# Patient Record
Sex: MALE | Race: WHITE | NOT HISPANIC OR LATINO | Employment: FULL TIME | URBAN - METROPOLITAN AREA
[De-identification: names, ages, dates, MRNs, and addresses within clinical notes are randomized per-mention and may not be internally consistent; named-entity substitution may affect disease eponyms.]

---

## 2018-04-17 LAB
CHOLEST SERPL-MCNC: 199 MG/DL (ref 100–199)
HDLC SERPL-MCNC: 51 MG/DL
LDLC SERPL CALC-MCNC: 105 MG/DL (ref 0–99)
TRIGL SERPL-MCNC: 213 MG/DL (ref 0–149)
VLDLC SERPL CALC-MCNC: 43 MG/DL (ref 5–40)

## 2019-01-17 LAB
BASOPHILS # BLD AUTO: 0.1 X10E3/UL (ref 0–0.2)
BASOPHILS NFR BLD AUTO: 1 %
EOSINOPHIL # BLD AUTO: 0.1 X10E3/UL (ref 0–0.4)
EOSINOPHIL NFR BLD AUTO: 1 %
ERYTHROCYTE [DISTWIDTH] IN BLOOD BY AUTOMATED COUNT: 12.8 % (ref 12.3–15.4)
HCT VFR BLD AUTO: 46.4 % (ref 37.5–51)
HGB BLD-MCNC: 15.2 G/DL (ref 13–17.7)
LYMPHOCYTES # BLD AUTO: 2.6 X10E3/UL (ref 0.7–3.1)
LYMPHOCYTES NFR BLD AUTO: 32 %
MCH RBC QN AUTO: 29.8 PG (ref 26.6–33)
MCHC RBC AUTO-ENTMCNC: 32.8 G/DL (ref 31.5–35.7)
MCV RBC AUTO: 91 FL (ref 79–97)
MONOCYTES # BLD AUTO: 0.4 X10E3/UL (ref 0.1–0.9)
MONOCYTES NFR BLD AUTO: 5 %
NEUTROPHILS # BLD AUTO: 4.9 X10E3/UL (ref 1.4–7)
NEUTROPHILS NFR BLD AUTO: 61 %
PLATELET # BLD AUTO: 306 X10E3/UL (ref 150–379)
RBC # BLD AUTO: 5.1 X10(6)/MCL (ref 4.14–5.8)
WBC # SPEC AUTO: 8.2 X10E3/UL (ref 3.4–10.8)

## 2019-01-28 LAB — MAGNESIUM SERPL-MCNC: 2.1 MG/DL (ref 1.6–2.3)

## 2019-03-26 ENCOUNTER — HISTORICAL (OUTPATIENT)
Dept: ADMINISTRATIVE | Facility: HOSPITAL | Age: 59
End: 2019-03-26

## 2019-03-26 LAB
ALBUMIN SERPL-MCNC: 4.4 G/DL (ref 3.6–4.8)
ALBUMIN/GLOB SERPL: 1.6 {RATIO} (ref 1.2–2.2)
ALP SERPL-CCNC: 52 IU/L (ref 39–117)
ALT SERPL-CCNC: 21 IU/L (ref 0–44)
AST SERPL-CCNC: 23 IU/L (ref 0–40)
BILIRUB SERPL-MCNC: 0.6 MG/DL (ref 0–1.2)
BUN SERPL-MCNC: 15 MG/DL (ref 8–27)
CALCIUM SERPL-MCNC: 10.1 MG/DL (ref 8.6–10.2)
CHLORIDE SERPL-SCNC: 100 MMOL/L (ref 96–106)
CHOLEST SERPL-MCNC: 188 MG/DL (ref 100–199)
CHOLEST/HDLC SERPL: 3.8 RATIO (ref 0–5)
CO2 SERPL-SCNC: 28 MMOL/L (ref 20–29)
CREAT SERPL-MCNC: 1.08 MG/DL (ref 0.76–1.27)
CREAT/UREA NIT SERPL: 14 (ref 10–24)
GLOBULIN SER-MCNC: 2.8 G/DL (ref 1.5–4.5)
GLUCOSE SERPL-MCNC: 103 MG/DL (ref 65–99)
HBA1C MFR BLD: 6.4 % (ref 4.8–5.6)
HDLC SERPL-MCNC: 49 MG/DL
LDLC SERPL CALC-MCNC: 118 MG/DL (ref 0–99)
POTASSIUM SERPL-SCNC: 5 MMOL/L (ref 3.5–5.2)
PROT SERPL-MCNC: 7.2 G/DL (ref 6–8.5)
SODIUM SERPL-SCNC: 141 MMOL/L (ref 134–144)
TRIGL SERPL-MCNC: 106 MG/DL (ref 0–149)
VLDLC SERPL CALC-MCNC: 21 MG/DL (ref 5–40)

## 2019-10-24 ENCOUNTER — HISTORICAL (OUTPATIENT)
Dept: ADMINISTRATIVE | Facility: HOSPITAL | Age: 59
End: 2019-10-24

## 2019-10-24 LAB
ALBUMIN SERPL-MCNC: 4.8 G/DL (ref 3.6–4.8)
ALBUMIN/GLOB SERPL: 2 {RATIO} (ref 1.2–2.2)
ALP SERPL-CCNC: 38 IU/L (ref 39–117)
ALT SERPL-CCNC: 22 IU/L (ref 0–44)
AST SERPL-CCNC: 19 IU/L (ref 0–40)
BASOPHILS # BLD AUTO: 0 X10E3/UL (ref 0–0.2)
BASOPHILS NFR BLD AUTO: 1 %
BILIRUB SERPL-MCNC: 0.8 MG/DL (ref 0–1.2)
BUN SERPL-MCNC: 18 MG/DL (ref 8–27)
CALCIUM SERPL-MCNC: 9.7 MG/DL (ref 8.6–10.2)
CHLORIDE SERPL-SCNC: 101 MMOL/L (ref 96–106)
CHOLEST SERPL-MCNC: 208 MG/DL (ref 100–199)
CHOLEST/HDLC SERPL: 4 RATIO (ref 0–5)
CO2 SERPL-SCNC: 25 MMOL/L (ref 20–29)
CREAT SERPL-MCNC: 1.21 MG/DL (ref 0.76–1.27)
CREAT/UREA NIT SERPL: 15 (ref 10–24)
EOSINOPHIL # BLD AUTO: 0 X10E3/UL (ref 0–0.4)
EOSINOPHIL NFR BLD AUTO: 1 %
ERYTHROCYTE [DISTWIDTH] IN BLOOD BY AUTOMATED COUNT: 12.9 % (ref 12.3–15.4)
GLOBULIN SER-MCNC: 2.4 G/DL (ref 1.5–4.5)
GLUCOSE SERPL-MCNC: 121 MG/DL (ref 65–99)
HBA1C MFR BLD: 6.3 % (ref 4.8–5.6)
HCT VFR BLD AUTO: 53.3 % (ref 37.5–51)
HDLC SERPL-MCNC: 52 MG/DL
HGB BLD-MCNC: 17.2 G/DL (ref 13–17.7)
LDLC SERPL CALC-MCNC: 136 MG/DL (ref 0–99)
LYMPHOCYTES # BLD AUTO: 2.6 X10E3/UL (ref 0.7–3.1)
LYMPHOCYTES NFR BLD AUTO: 37 %
MCH RBC QN AUTO: 29.9 PG (ref 26.6–33)
MCHC RBC AUTO-ENTMCNC: 32.3 G/DL (ref 31.5–35.7)
MCV RBC AUTO: 93 FL (ref 79–97)
MONOCYTES # BLD AUTO: 0.6 X10E3/UL (ref 0.1–0.9)
MONOCYTES NFR BLD AUTO: 8 %
NEUTROPHILS # BLD AUTO: 3.8 X10E3/UL (ref 1.4–7)
NEUTROPHILS NFR BLD AUTO: 53 %
PLATELET # BLD AUTO: 282 X10E3/UL (ref 150–450)
POTASSIUM SERPL-SCNC: 4.9 MMOL/L (ref 3.5–5.2)
PROT SERPL-MCNC: 7.2 G/DL (ref 6–8.5)
RBC # BLD AUTO: 5.75 X10(6)/MCL (ref 4.14–5.8)
SODIUM SERPL-SCNC: 143 MMOL/L (ref 134–144)
TRIGL SERPL-MCNC: 101 MG/DL (ref 0–149)
VLDLC SERPL CALC-MCNC: 20 MG/DL (ref 5–40)
WBC # SPEC AUTO: 7 X10E3/UL (ref 3.4–10.8)

## 2020-02-03 ENCOUNTER — HISTORICAL (OUTPATIENT)
Dept: ADMINISTRATIVE | Facility: HOSPITAL | Age: 60
End: 2020-02-03

## 2020-02-03 LAB
BASOPHILS # BLD AUTO: 0.1 X10E3/UL (ref 0–0.2)
BASOPHILS NFR BLD AUTO: 1 %
EOSINOPHIL # BLD AUTO: 0.1 X10E3/UL (ref 0–0.4)
EOSINOPHIL NFR BLD AUTO: 2 %
ERYTHROCYTE [DISTWIDTH] IN BLOOD BY AUTOMATED COUNT: 13 % (ref 11.6–15.4)
HCT VFR BLD AUTO: 48.3 % (ref 37.5–51)
HGB BLD-MCNC: 15.8 G/DL (ref 13–17.7)
LYMPHOCYTES # BLD AUTO: 2.7 X10E3/UL (ref 0.7–3.1)
LYMPHOCYTES NFR BLD AUTO: 33 %
MCH RBC QN AUTO: 29.5 PG (ref 26.6–33)
MCHC RBC AUTO-ENTMCNC: 32.7 G/DL (ref 31.5–35.7)
MCV RBC AUTO: 90 FL (ref 79–97)
MONOCYTES # BLD AUTO: 0.6 X10E3/UL (ref 0.1–0.9)
MONOCYTES NFR BLD AUTO: 7 %
NEUTROPHILS # BLD AUTO: 4.5 X10E3/UL (ref 1.4–7)
NEUTROPHILS NFR BLD AUTO: 57 %
PLATELET # BLD AUTO: 313 X10E3/UL (ref 150–450)
RBC # BLD AUTO: 5.35 X10(6)/MCL (ref 4.14–5.8)
WBC # SPEC AUTO: 8 X10E3/UL (ref 3.4–10.8)

## 2020-08-11 ENCOUNTER — HISTORICAL (OUTPATIENT)
Dept: ADMINISTRATIVE | Facility: HOSPITAL | Age: 60
End: 2020-08-11

## 2020-08-11 LAB
ALBUMIN SERPL-MCNC: 4.5 G/DL (ref 3.8–4.8)
ALBUMIN/GLOB SERPL: 1.7 {RATIO} (ref 1.2–2.2)
ALP SERPL-CCNC: 39 IU/L (ref 39–117)
ALT SERPL-CCNC: 27 IU/L (ref 0–44)
AST SERPL-CCNC: 22 IU/L (ref 0–40)
BASOPHILS # BLD AUTO: 0.1 X10E3/UL (ref 0–0.2)
BASOPHILS NFR BLD AUTO: 1 %
BILIRUB SERPL-MCNC: 0.7 MG/DL (ref 0–1.2)
BUN SERPL-MCNC: 17 MG/DL (ref 8–27)
CALCIUM SERPL-MCNC: 9.7 MG/DL (ref 8.6–10.2)
CHLORIDE SERPL-SCNC: 101 MMOL/L (ref 96–106)
CHOLEST SERPL-MCNC: 175 MG/DL (ref 100–199)
CHOLEST/HDLC SERPL: 3.4 RATIO (ref 0–5)
CO2 SERPL-SCNC: 28 MMOL/L (ref 20–29)
CREAT SERPL-MCNC: 1.17 MG/DL (ref 0.76–1.27)
CREAT/UREA NIT SERPL: 15 (ref 10–24)
EOSINOPHIL # BLD AUTO: 0.1 X10E3/UL (ref 0–0.4)
EOSINOPHIL NFR BLD AUTO: 1 %
ERYTHROCYTE [DISTWIDTH] IN BLOOD BY AUTOMATED COUNT: 12.9 % (ref 11.6–15.4)
GLOBULIN SER-MCNC: 2.6 G/DL (ref 1.5–4.5)
GLUCOSE SERPL-MCNC: 129 MG/DL (ref 65–99)
HBA1C MFR BLD: 6.6 % (ref 4.8–5.6)
HCT VFR BLD AUTO: 49.5 % (ref 37.5–51)
HDLC SERPL-MCNC: 51 MG/DL
HGB BLD-MCNC: 16 G/DL (ref 13–17.7)
LDLC SERPL CALC-MCNC: 104 MG/DL (ref 0–99)
LYMPHOCYTES # BLD AUTO: 2.7 X10E3/UL (ref 0.7–3.1)
LYMPHOCYTES NFR BLD AUTO: 37 %
MCH RBC QN AUTO: 29.7 PG (ref 26.6–33)
MCHC RBC AUTO-ENTMCNC: 32.3 G/DL (ref 31.5–35.7)
MCV RBC AUTO: 92 FL (ref 79–97)
MONOCYTES # BLD AUTO: 0.5 X10E3/UL (ref 0.1–0.9)
MONOCYTES NFR BLD AUTO: 7 %
NEUTROPHILS # BLD AUTO: 3.8 X10E3/UL (ref 1.4–7)
NEUTROPHILS NFR BLD AUTO: 54 %
PLATELET # BLD AUTO: 263 X10E3/UL (ref 150–450)
POTASSIUM SERPL-SCNC: 5.1 MMOL/L (ref 3.5–5.2)
PROT SERPL-MCNC: 7.1 G/DL (ref 6–8.5)
RBC # BLD AUTO: 5.39 X10(6)/MCL (ref 4.14–5.8)
SODIUM SERPL-SCNC: 140 MMOL/L (ref 134–144)
TESTOST SERPL-MCNC: 870 NG/DL (ref 264–916)
TRIGL SERPL-MCNC: 102 MG/DL (ref 0–149)
TSH SERPL-ACNC: 1.61 MIU/ML (ref 0.45–4.5)
VLDLC SERPL CALC-MCNC: 20 MG/DL (ref 5–40)
WBC # SPEC AUTO: 7.2 X10E3/UL (ref 3.4–10.8)

## 2020-09-04 ENCOUNTER — HISTORICAL (OUTPATIENT)
Dept: NUTRITION | Facility: HOSPITAL | Age: 60
End: 2020-09-04

## 2020-11-04 ENCOUNTER — HISTORICAL (OUTPATIENT)
Dept: ADMINISTRATIVE | Facility: HOSPITAL | Age: 60
End: 2020-11-04

## 2020-11-04 LAB
BUN SERPL-MCNC: 11 MG/DL (ref 8–27)
CALCIUM SERPL-MCNC: 9.6 MG/DL (ref 8.6–10.2)
CHLORIDE SERPL-SCNC: 103 MMOL/L (ref 96–106)
CO2 SERPL-SCNC: 26 MMOL/L (ref 20–29)
CREAT SERPL-MCNC: 0.95 MG/DL (ref 0.76–1.27)
HBA1C MFR BLD: 5.7 % (ref 4.8–5.6)
POTASSIUM SERPL-SCNC: 4.7 MMOL/L (ref 3.5–5.2)
SODIUM SERPL-SCNC: 142 MMOL/L (ref 134–144)

## 2020-11-05 LAB — EST CREAT CLEARANCE SER (OHS): 94.55 ML/MIN

## 2020-12-01 LAB — MICROALBUMIN/CREAT RATIO PNL UR: <34 MG/G CREAT (ref 0–29)

## 2021-05-05 ENCOUNTER — HISTORICAL (OUTPATIENT)
Dept: ADMINISTRATIVE | Facility: HOSPITAL | Age: 61
End: 2021-05-05

## 2021-05-05 LAB
ALBUMIN SERPL-MCNC: 4.7 G/DL (ref 3.8–4.8)
ALBUMIN/GLOB SERPL: 1.8 {RATIO} (ref 1.2–2.2)
ALP SERPL-CCNC: 42 IU/L (ref 39–117)
ALT SERPL-CCNC: 19 IU/L (ref 0–44)
AST SERPL-CCNC: 20 IU/L (ref 0–40)
BASOPHILS # BLD AUTO: 0.1 X10E3/UL (ref 0–0.2)
BASOPHILS NFR BLD AUTO: 2 %
BILIRUB SERPL-MCNC: 0.7 MG/DL (ref 0–1.2)
BUN SERPL-MCNC: 17 MG/DL (ref 8–27)
CALCIUM SERPL-MCNC: 9.7 MG/DL (ref 8.6–10.2)
CHLORIDE SERPL-SCNC: 102 MMOL/L (ref 96–106)
CHOLEST SERPL-MCNC: 178 MG/DL (ref 100–199)
CHOLEST/HDLC SERPL: 2.7 RATIO (ref 0–5)
CO2 SERPL-SCNC: 28 MMOL/L (ref 20–29)
CREAT SERPL-MCNC: 1.04 MG/DL (ref 0.76–1.27)
CREAT/UREA NIT SERPL: 16 (ref 10–24)
EOSINOPHIL # BLD AUTO: 0.1 X10E3/UL (ref 0–0.4)
EOSINOPHIL NFR BLD AUTO: 1 %
ERYTHROCYTE [DISTWIDTH] IN BLOOD BY AUTOMATED COUNT: 13.1 % (ref 11.6–15.4)
GLOBULIN SER-MCNC: 2.6 G/DL (ref 1.5–4.5)
GLUCOSE SERPL-MCNC: 110 MG/DL (ref 65–99)
HBA1C MFR BLD: 5.7 % (ref 4.8–5.6)
HCT VFR BLD AUTO: 50.2 % (ref 37.5–51)
HDLC SERPL-MCNC: 65 MG/DL
HGB BLD-MCNC: 16.4 G/DL (ref 13–17.7)
LDLC SERPL CALC-MCNC: 101 MG/DL (ref 0–99)
LYMPHOCYTES # BLD AUTO: 2.2 X10E3/UL (ref 0.7–3.1)
LYMPHOCYTES NFR BLD AUTO: 32 %
MCH RBC QN AUTO: 30.4 PG (ref 26.6–33)
MCHC RBC AUTO-ENTMCNC: 32.7 G/DL (ref 31.5–35.7)
MCV RBC AUTO: 93 FL (ref 79–97)
MONOCYTES # BLD AUTO: 0.4 X10E3/UL (ref 0.1–0.9)
MONOCYTES NFR BLD AUTO: 6 %
NEUTROPHILS # BLD AUTO: 3.9 X10E3/UL (ref 1.4–7)
NEUTROPHILS NFR BLD AUTO: 59 %
PLATELET # BLD AUTO: 288 X10E3/UL (ref 150–450)
POTASSIUM SERPL-SCNC: 4.7 MMOL/L (ref 3.5–5.2)
PROT SERPL-MCNC: 7.3 G/DL (ref 6–8.5)
RBC # BLD AUTO: 5.4 X10(6)/MCL (ref 4.14–5.8)
SODIUM SERPL-SCNC: 141 MMOL/L (ref 134–144)
TRIGL SERPL-MCNC: 63 MG/DL (ref 0–149)
VLDLC SERPL CALC-MCNC: 12 MG/DL (ref 5–40)
WBC # SPEC AUTO: 6.7 X10E3/UL (ref 3.4–10.8)

## 2021-05-12 LAB
LEFT EYE DM RETINOPATHY: NEGATIVE
RIGHT EYE DM RETINOPATHY: NEGATIVE

## 2021-05-26 ENCOUNTER — HISTORICAL (OUTPATIENT)
Dept: LAB | Facility: HOSPITAL | Age: 61
End: 2021-05-26

## 2021-05-26 LAB
ALBUMIN SERPL-MCNC: 3.9 GM/DL (ref 3.4–4.8)
ALBUMIN/GLOB SERPL: 1.3 RATIO (ref 1.1–2)
ALP SERPL-CCNC: 41 UNIT/L (ref 40–150)
ALT SERPL-CCNC: 23 UNIT/L (ref 0–55)
AST SERPL-CCNC: 24 UNIT/L (ref 5–34)
BILIRUB SERPL-MCNC: 0.6 MG/DL
BILIRUBIN DIRECT+TOT PNL SERPL-MCNC: 0.3 MG/DL (ref 0–0.5)
BILIRUBIN DIRECT+TOT PNL SERPL-MCNC: 0.3 MG/DL (ref 0–0.8)
BUN SERPL-MCNC: 18.2 MG/DL (ref 8.4–25.7)
CALCIUM SERPL-MCNC: 9.2 MG/DL (ref 8.8–10)
CHLORIDE SERPL-SCNC: 102 MMOL/L (ref 98–107)
CO2 SERPL-SCNC: 29 MMOL/L (ref 23–31)
CREAT SERPL-MCNC: 1.02 MG/DL (ref 0.73–1.18)
ERYTHROCYTE [DISTWIDTH] IN BLOOD BY AUTOMATED COUNT: 12.7 % (ref 11.5–17)
ESTRADIOL SERPL HS-MCNC: 41 PG/ML
GLOBULIN SER-MCNC: 3 GM/DL (ref 2.4–3.5)
GLUCOSE SERPL-MCNC: 118 MG/DL (ref 82–115)
HCT VFR BLD AUTO: 48.4 % (ref 42–52)
HGB BLD-MCNC: 16 GM/DL (ref 14–18)
MCH RBC QN AUTO: 30.2 PG (ref 27–31)
MCHC RBC AUTO-ENTMCNC: 33.1 GM/DL (ref 33–36)
MCV RBC AUTO: 91.3 FL (ref 80–94)
PLATELET # BLD AUTO: 270 X10(3)/MCL (ref 130–400)
PMV BLD AUTO: 9.2 FL (ref 9.4–12.4)
POTASSIUM SERPL-SCNC: 5 MMOL/L (ref 3.5–5.1)
PROT SERPL-MCNC: 6.9 GM/DL (ref 5.8–7.6)
PSA SERPL-MCNC: 2.62 NG/ML
RBC # BLD AUTO: 5.3 X10(6)/MCL (ref 4.7–6.1)
SODIUM SERPL-SCNC: 140 MMOL/L (ref 136–145)
TESTOST SERPL-MCNC: 812.84 NG/DL (ref 220.91–715.81)
WBC # SPEC AUTO: 6.8 X10(3)/MCL (ref 4.5–11.5)

## 2021-06-24 ENCOUNTER — HISTORICAL (OUTPATIENT)
Dept: RADIOLOGY | Facility: HOSPITAL | Age: 61
End: 2021-06-24

## 2021-06-24 LAB — BCS RECOMMENDATION EXT: NORMAL

## 2021-10-04 LAB — CRC RECOMMENDATION EXT: NORMAL

## 2021-11-18 ENCOUNTER — HISTORICAL (OUTPATIENT)
Dept: ADMINISTRATIVE | Facility: HOSPITAL | Age: 61
End: 2021-11-18

## 2021-11-18 LAB
ALBUMIN SERPL-MCNC: 4.3 G/DL (ref 3.8–4.8)
ALBUMIN/GLOB SERPL: 1.7 {RATIO} (ref 1.2–2.2)
ALP SERPL-CCNC: 41 IU/L (ref 44–121)
ALT SERPL-CCNC: 19 IU/L (ref 0–44)
AST SERPL-CCNC: 19 IU/L (ref 0–40)
BASOPHILS # BLD AUTO: 0.1 X10E3/UL (ref 0–0.2)
BASOPHILS NFR BLD AUTO: 2 %
BILIRUB SERPL-MCNC: 0.5 MG/DL (ref 0–1.2)
BUN SERPL-MCNC: 14 MG/DL (ref 8–27)
CALCIUM SERPL-MCNC: 9.3 MG/DL (ref 8.6–10.2)
CHLORIDE SERPL-SCNC: 103 MMOL/L (ref 96–106)
CO2 SERPL-SCNC: 29 MMOL/L (ref 20–29)
CREAT SERPL-MCNC: 1.05 MG/DL (ref 0.76–1.27)
CREAT/UREA NIT SERPL: 13 (ref 10–24)
EOSINOPHIL # BLD AUTO: 0.1 X10E3/UL (ref 0–0.4)
EOSINOPHIL NFR BLD AUTO: 1 %
ERYTHROCYTE [DISTWIDTH] IN BLOOD BY AUTOMATED COUNT: 12.7 % (ref 11.6–15.4)
GLOBULIN SER-MCNC: 2.5 G/DL (ref 1.5–4.5)
GLUCOSE SERPL-MCNC: 103 MG/DL (ref 65–99)
HBA1C MFR BLD: 6 % (ref 4.8–5.6)
HCT VFR BLD AUTO: 46.8 % (ref 37.5–51)
HGB BLD-MCNC: 15.4 G/DL (ref 13–17.7)
LYMPHOCYTES # BLD AUTO: 2.2 X10E3/UL (ref 0.7–3.1)
LYMPHOCYTES NFR BLD AUTO: 35 %
MCH RBC QN AUTO: 30.1 PG (ref 26.6–33)
MCHC RBC AUTO-ENTMCNC: 32.9 G/DL (ref 31.5–35.7)
MCV RBC AUTO: 92 FL (ref 79–97)
MONOCYTES # BLD AUTO: 0.4 X10E3/UL (ref 0.1–0.9)
MONOCYTES NFR BLD AUTO: 6 %
NEUTROPHILS # BLD AUTO: 3.5 X10E3/UL (ref 1.4–7)
NEUTROPHILS NFR BLD AUTO: 56 %
PLATELET # BLD AUTO: 279 X10E3/UL (ref 150–450)
POTASSIUM SERPL-SCNC: 4.6 MMOL/L (ref 3.5–5.2)
PROT SERPL-MCNC: 6.8 G/DL (ref 6–8.5)
RBC # BLD AUTO: 5.11 X10(6)/MCL (ref 4.14–5.8)
SODIUM SERPL-SCNC: 140 MMOL/L (ref 134–144)
WBC # SPEC AUTO: 6.3 X10E3/UL (ref 3.4–10.8)

## 2022-04-10 ENCOUNTER — HISTORICAL (OUTPATIENT)
Dept: ADMINISTRATIVE | Facility: HOSPITAL | Age: 62
End: 2022-04-10

## 2022-04-25 VITALS
SYSTOLIC BLOOD PRESSURE: 137 MMHG | HEIGHT: 68 IN | WEIGHT: 193.25 LBS | OXYGEN SATURATION: 96 % | BODY MASS INDEX: 29.29 KG/M2 | DIASTOLIC BLOOD PRESSURE: 71 MMHG

## 2022-05-09 RX ORDER — AZELASTINE 1 MG/ML
SPRAY, METERED NASAL
COMMUNITY
Start: 2021-11-18

## 2022-05-09 RX ORDER — TAMSULOSIN HYDROCHLORIDE 0.4 MG/1
CAPSULE ORAL
COMMUNITY
Start: 2022-01-20 | End: 2022-07-21

## 2022-05-09 RX ORDER — TESTOSTERONE CYPIONATE 1000 MG/10ML
INJECTION, SOLUTION INTRAMUSCULAR
COMMUNITY
Start: 2022-02-17 | End: 2022-05-10 | Stop reason: SDUPTHER

## 2022-05-09 RX ORDER — MIRABEGRON 50 MG/1
TABLET, FILM COATED, EXTENDED RELEASE ORAL
COMMUNITY
Start: 2022-02-05

## 2022-05-10 ENCOUNTER — OFFICE VISIT (OUTPATIENT)
Dept: PRIMARY CARE CLINIC | Facility: CLINIC | Age: 62
End: 2022-05-10
Payer: COMMERCIAL

## 2022-05-10 VITALS
OXYGEN SATURATION: 96 % | HEART RATE: 56 BPM | SYSTOLIC BLOOD PRESSURE: 148 MMHG | RESPIRATION RATE: 16 BRPM | BODY MASS INDEX: 31.26 KG/M2 | TEMPERATURE: 97 F | WEIGHT: 199.19 LBS | HEIGHT: 67 IN | DIASTOLIC BLOOD PRESSURE: 81 MMHG

## 2022-05-10 DIAGNOSIS — J30.1 SEASONAL ALLERGIC RHINITIS DUE TO POLLEN: ICD-10-CM

## 2022-05-10 DIAGNOSIS — R03.0 ELEVATED BLOOD PRESSURE READING WITHOUT DIAGNOSIS OF HYPERTENSION: ICD-10-CM

## 2022-05-10 DIAGNOSIS — Z79.890 LONG-TERM CURRENT USE OF TESTOSTERONE REPLACEMENT THERAPY: ICD-10-CM

## 2022-05-10 DIAGNOSIS — R79.89 LOW TESTOSTERONE IN MALE: ICD-10-CM

## 2022-05-10 DIAGNOSIS — Z12.5 PROSTATE CANCER SCREENING: ICD-10-CM

## 2022-05-10 DIAGNOSIS — G47.33 OBSTRUCTIVE SLEEP APNEA ON CPAP: ICD-10-CM

## 2022-05-10 DIAGNOSIS — N40.0 BENIGN PROSTATIC HYPERPLASIA, UNSPECIFIED WHETHER LOWER URINARY TRACT SYMPTOMS PRESENT: ICD-10-CM

## 2022-05-10 DIAGNOSIS — E11.9 TYPE 2 DIABETES MELLITUS WITHOUT COMPLICATION, WITHOUT LONG-TERM CURRENT USE OF INSULIN: ICD-10-CM

## 2022-05-10 DIAGNOSIS — Z00.00 WELLNESS EXAMINATION: Primary | ICD-10-CM

## 2022-05-10 PROBLEM — J30.9 ALLERGIC RHINITIS: Status: ACTIVE | Noted: 2022-05-10

## 2022-05-10 PROBLEM — N52.9 ED (ERECTILE DYSFUNCTION) OF ORGANIC ORIGIN: Status: ACTIVE | Noted: 2022-05-10

## 2022-05-10 PROBLEM — K57.90 DIVERTICULAR DISEASE: Status: ACTIVE | Noted: 2022-05-10

## 2022-05-10 PROBLEM — D12.6 ADENOMATOUS POLYP OF COLON: Status: ACTIVE | Noted: 2022-05-10

## 2022-05-10 PROBLEM — M70.41 PREPATELLAR BURSITIS, RIGHT KNEE: Status: ACTIVE | Noted: 2022-05-10

## 2022-05-10 LAB
ALBUMIN SERPL-MCNC: 3.7 GM/DL (ref 3.4–4.8)
ALBUMIN/GLOB SERPL: 1.3 RATIO (ref 1.1–2)
ALP SERPL-CCNC: 41 UNIT/L (ref 40–150)
ALT SERPL-CCNC: 16 UNIT/L (ref 0–55)
APPEARANCE UR: CLEAR
AST SERPL-CCNC: 18 UNIT/L (ref 5–34)
BACTERIA #/AREA URNS AUTO: NORMAL /HPF
BASOPHILS # BLD AUTO: 0.06 X10(3)/MCL (ref 0–0.2)
BASOPHILS NFR BLD AUTO: 0.8 %
BILIRUB UR QL STRIP.AUTO: NEGATIVE MG/DL
BILIRUBIN DIRECT+TOT PNL SERPL-MCNC: 0.3 MG/DL (ref 0–0.5)
BILIRUBIN DIRECT+TOT PNL SERPL-MCNC: 0.4 MG/DL (ref 0–0.8)
BILIRUBIN DIRECT+TOT PNL SERPL-MCNC: 0.7 MG/DL
BUN SERPL-MCNC: 14.4 MG/DL (ref 8.4–25.7)
CALCIUM SERPL-MCNC: 9.5 MG/DL (ref 8.8–10)
CHLORIDE SERPL-SCNC: 105 MMOL/L (ref 98–107)
CHOLEST SERPL-MCNC: 172 MG/DL
CHOLEST/HDLC SERPL: 3 {RATIO} (ref 0–5)
CO2 SERPL-SCNC: 28 MMOL/L (ref 23–31)
COLOR UR AUTO: YELLOW
CREAT SERPL-MCNC: 0.97 MG/DL (ref 0.73–1.18)
EOSINOPHIL # BLD AUTO: 0.06 X10(3)/MCL (ref 0–0.9)
EOSINOPHIL NFR BLD AUTO: 0.8 %
ERYTHROCYTE [DISTWIDTH] IN BLOOD BY AUTOMATED COUNT: 12.9 % (ref 11.5–17)
EST. AVERAGE GLUCOSE BLD GHB EST-MCNC: 114 MG/DL
GLOBULIN SER-MCNC: 2.8 GM/DL (ref 2.4–3.5)
GLUCOSE SERPL-MCNC: 124 MG/DL (ref 82–115)
GLUCOSE UR QL STRIP.AUTO: NEGATIVE MG/DL
HBA1C MFR BLD: 5.6 %
HCT VFR BLD AUTO: 45.7 % (ref 42–52)
HDLC SERPL-MCNC: 57 MG/DL (ref 35–60)
HGB BLD-MCNC: 14.6 GM/DL (ref 14–18)
IMM GRANULOCYTES # BLD AUTO: 0.01 X10(3)/MCL (ref 0–0.02)
IMM GRANULOCYTES NFR BLD AUTO: 0.1 % (ref 0–0.43)
KETONES UR QL STRIP.AUTO: NEGATIVE MG/DL
LDLC SERPL CALC-MCNC: 93 MG/DL (ref 50–140)
LEUKOCYTE ESTERASE UR QL STRIP.AUTO: NEGATIVE UNIT/L
LYMPHOCYTES # BLD AUTO: 1.99 X10(3)/MCL (ref 0.6–4.6)
LYMPHOCYTES NFR BLD AUTO: 26.7 %
MCH RBC QN AUTO: 29.7 PG (ref 27–31)
MCHC RBC AUTO-ENTMCNC: 31.9 MG/DL (ref 33–36)
MCV RBC AUTO: 93.1 FL (ref 80–94)
MONOCYTES # BLD AUTO: 0.5 X10(3)/MCL (ref 0.1–1.3)
MONOCYTES NFR BLD AUTO: 6.7 %
NEUTROPHILS # BLD AUTO: 4.8 X10(3)/MCL (ref 2.1–9.2)
NEUTROPHILS NFR BLD AUTO: 64.9 %
NITRITE UR QL STRIP.AUTO: NEGATIVE
PH UR STRIP.AUTO: 7 [PH]
PLATELET # BLD AUTO: 264 X10(3)/MCL (ref 130–400)
PMV BLD AUTO: 10.1 FL (ref 9.4–12.4)
POTASSIUM SERPL-SCNC: 4.3 MMOL/L (ref 3.5–5.1)
PROT SERPL-MCNC: 6.5 GM/DL (ref 5.8–7.6)
PROT UR QL STRIP.AUTO: NEGATIVE MG/DL
PSA SERPL-MCNC: 4.98 NG/ML
RBC # BLD AUTO: 4.91 X10(6)/MCL (ref 4.7–6.1)
RBC #/AREA URNS AUTO: NORMAL /HPF
RBC UR QL AUTO: NEGATIVE UNIT/L
SODIUM SERPL-SCNC: 142 MMOL/L (ref 136–145)
SP GR UR STRIP.AUTO: 1.02
SQUAMOUS #/AREA URNS AUTO: NORMAL /LPF
TRIGL SERPL-MCNC: 109 MG/DL (ref 34–140)
TSH SERPL-ACNC: 1.04 UIU/ML (ref 0.35–4.94)
UROBILINOGEN UR STRIP-ACNC: 0.2 MG/DL
VLDLC SERPL CALC-MCNC: 22 MG/DL
WBC # SPEC AUTO: 7.5 X10(3)/MCL (ref 4.5–11.5)
WBC #/AREA URNS AUTO: NORMAL /HPF

## 2022-05-10 PROCEDURE — 81003 URINALYSIS AUTO W/O SCOPE: CPT | Performed by: INTERNAL MEDICINE

## 2022-05-10 PROCEDURE — 1160F RVW MEDS BY RX/DR IN RCRD: CPT | Mod: CPTII,,, | Performed by: INTERNAL MEDICINE

## 2022-05-10 PROCEDURE — 80053 COMPREHEN METABOLIC PANEL: CPT | Performed by: INTERNAL MEDICINE

## 2022-05-10 PROCEDURE — 3008F PR BODY MASS INDEX (BMI) DOCUMENTED: ICD-10-PCS | Mod: CPTII,,, | Performed by: INTERNAL MEDICINE

## 2022-05-10 PROCEDURE — 84153 ASSAY OF PSA TOTAL: CPT | Performed by: INTERNAL MEDICINE

## 2022-05-10 PROCEDURE — 84403 ASSAY OF TOTAL TESTOSTERONE: CPT | Performed by: INTERNAL MEDICINE

## 2022-05-10 PROCEDURE — 99396 PR PREVENTIVE VISIT,EST,40-64: ICD-10-PCS | Mod: ,,, | Performed by: INTERNAL MEDICINE

## 2022-05-10 PROCEDURE — 83036 HEMOGLOBIN GLYCOSYLATED A1C: CPT | Performed by: INTERNAL MEDICINE

## 2022-05-10 PROCEDURE — 1159F MED LIST DOCD IN RCRD: CPT | Mod: CPTII,,, | Performed by: INTERNAL MEDICINE

## 2022-05-10 PROCEDURE — 99396 PREV VISIT EST AGE 40-64: CPT | Mod: ,,, | Performed by: INTERNAL MEDICINE

## 2022-05-10 PROCEDURE — 80061 LIPID PANEL: CPT | Performed by: INTERNAL MEDICINE

## 2022-05-10 PROCEDURE — 85025 COMPLETE CBC W/AUTO DIFF WBC: CPT | Performed by: INTERNAL MEDICINE

## 2022-05-10 PROCEDURE — 3079F DIAST BP 80-89 MM HG: CPT | Mod: CPTII,,, | Performed by: INTERNAL MEDICINE

## 2022-05-10 PROCEDURE — 1160F PR REVIEW ALL MEDS BY PRESCRIBER/CLIN PHARMACIST DOCUMENTED: ICD-10-PCS | Mod: CPTII,,, | Performed by: INTERNAL MEDICINE

## 2022-05-10 PROCEDURE — 81015 MICROSCOPIC EXAM OF URINE: CPT | Performed by: INTERNAL MEDICINE

## 2022-05-10 PROCEDURE — 1159F PR MEDICATION LIST DOCUMENTED IN MEDICAL RECORD: ICD-10-PCS | Mod: CPTII,,, | Performed by: INTERNAL MEDICINE

## 2022-05-10 PROCEDURE — 3077F SYST BP >= 140 MM HG: CPT | Mod: CPTII,,, | Performed by: INTERNAL MEDICINE

## 2022-05-10 PROCEDURE — 36415 COLL VENOUS BLD VENIPUNCTURE: CPT | Performed by: INTERNAL MEDICINE

## 2022-05-10 PROCEDURE — 3008F BODY MASS INDEX DOCD: CPT | Mod: CPTII,,, | Performed by: INTERNAL MEDICINE

## 2022-05-10 PROCEDURE — 3079F PR MOST RECENT DIASTOLIC BLOOD PRESSURE 80-89 MM HG: ICD-10-PCS | Mod: CPTII,,, | Performed by: INTERNAL MEDICINE

## 2022-05-10 PROCEDURE — 84443 ASSAY THYROID STIM HORMONE: CPT | Performed by: INTERNAL MEDICINE

## 2022-05-10 PROCEDURE — 82043 UR ALBUMIN QUANTITATIVE: CPT | Performed by: INTERNAL MEDICINE

## 2022-05-10 PROCEDURE — 3077F PR MOST RECENT SYSTOLIC BLOOD PRESSURE >= 140 MM HG: ICD-10-PCS | Mod: CPTII,,, | Performed by: INTERNAL MEDICINE

## 2022-05-10 RX ORDER — AMPICILLIN TRIHYDRATE 250 MG
1200 CAPSULE ORAL
COMMUNITY
Start: 2021-05-05

## 2022-05-10 RX ORDER — IBUPROFEN 200 MG
TABLET ORAL
COMMUNITY
End: 2023-09-05

## 2022-05-10 RX ORDER — NEEDLES, DISPOSABLE 25GX5/8"
NEEDLE, DISPOSABLE MISCELLANEOUS
COMMUNITY
Start: 2021-10-21 | End: 2023-09-05

## 2022-05-10 RX ORDER — TESTOSTERONE CYPIONATE 1000 MG/10ML
150 INJECTION, SOLUTION INTRAMUSCULAR
COMMUNITY
End: 2023-01-04 | Stop reason: SDUPTHER

## 2022-05-10 RX ORDER — FLUTICASONE PROPIONATE 50 MCG
1 SPRAY, SUSPENSION (ML) NASAL DAILY
Qty: 16 G | Refills: 3 | Status: SHIPPED | OUTPATIENT
Start: 2022-05-10

## 2022-05-10 RX ORDER — ACETAMINOPHEN, DIPHENHYDRAMINE HCL, PHENYLEPHRINE HCL 325; 25; 5 MG/1; MG/1; MG/1
TABLET ORAL
COMMUNITY

## 2022-05-10 RX ORDER — MULTIVITAMIN WITH IRON
TABLET ORAL
COMMUNITY
Start: 2021-05-05

## 2022-05-10 RX ORDER — TESTOSTERONE CYPIONATE 1000 MG/10ML
150 INJECTION, SOLUTION INTRAMUSCULAR
COMMUNITY
Start: 2021-10-20 | End: 2022-05-10 | Stop reason: SDUPTHER

## 2022-05-10 RX ORDER — SILDENAFIL 100 MG/1
100 TABLET, FILM COATED ORAL
COMMUNITY
Start: 2022-05-02

## 2022-05-10 NOTE — PROGRESS NOTES
Thu Soriano MD   [address] [place] [address]     PATIENT NAME: Jason Choi  : 1960  DATE: 5/10/22  MRN: [id number]      Billing Provider: Thu Soriano MD  Level of Service: NC PREVENTIVE VISIT,EST,40-64  Patient PCP Information     Provider PCP Type    Thu Soriano MD General          Reason for Visit / Chief Complaint: 6 mth f/u (F/u visit)       Update PCP  Update Chief Complaint         History of Present Illness / Problem Focused Workflow     Jason Choi presents to the clinic with 6 mth f/u (F/u visit)     61 year old CM seen for annual. He is due for his eye visit this week. He will see Dr Nolen his dermatologist this month. He saw Neusetzer a few weeks ago. He gave him Viagra and Cialis to try. Viagra worked better He wanted to see him mid cycle for testosterone. They put the Rx through for it. He is due with the heart doctor in a few months.   No changes in family  Exercise is not as much as he was. Diet is still fair.   No change in Fhx  Daughter just had a son. They will be moving back soon.       Review of Systems     Review of Systems   Constitutional: Negative.    HENT: Positive for postnasal drip, rhinorrhea and sinus pressure.    Eyes: Negative.    Respiratory: Negative.    Cardiovascular:        No CP but woke one morning when wife was out of state and just didn't feel normal, his BP was good. It passed in an hour or so. He wasn't weak per se but not right. Due with new cardiologist soon, Dr Corea   Endocrine: Negative for cold intolerance, heat intolerance, polydipsia, polyphagia and polyuria.   Genitourinary: Negative for dysuria and hematuria.   Musculoskeletal:        Knee has been doing ok   Skin:        Smashed the right thumb the other day, he drained it. He has BM on the left elbow region, not changing   Allergic/Immunologic: Positive for environmental allergies.   Neurological: Negative.    Hematological: Negative.    Psychiatric/Behavioral: Negative.         Medical / Social / Family History     Past Medical History:   Diagnosis Date    BPH (benign prostatic hyperplasia)     Calcaneal spur of foot     Decreased testosterone level     Diverticulosis     Lipoma of forearm     Male erectile dysfunction, unspecified     LAWSON on CPAP     Tubular adenoma of colon     Type 2 diabetes mellitus without complications        Past Surgical History:   Procedure Laterality Date    COLON BIOPSY  10/04/2021    LIPOMA RESECTION         Social History  Mr. Choi      reports that he has never smoked. He has never used smokeless tobacco. He reports current alcohol use of about 2.0 standard drinks of alcohol per week. He reports that he does not use drugs.    Family History  Mr.'s Choi   family history includes COPD in his mother; Diabetes in his father, mother, and sister; Hypertension in his brother, father, and sister; Kidney disease in his father; Osteoarthritis in his father; Prostate cancer in his father; Throat cancer in his sister.    Medications and Allergies     Medications  Outpatient Medications Marked as Taking for the 5/10/22 encounter (Office Visit) with Thu Soriano MD   Medication Sig Dispense Refill    ascorbic acid/zinc (ZINC WITH VITAMIN C ORAL) Take by mouth.      aspirin 81 mg Cap aspirin Take No date recorded No form recorded No frequency recorded No route recorded No set duration recorded No set duration amount recorded active No dosage strength recorded No dosage strength units of measure recorded      azelastine (ASTELIN) 137 mcg (0.1 %) nasal spray by Nasal route.      docosahexaenoic acid/epa (FISH OIL ORAL) Take 500 mg by mouth once daily at 6am.      ibuprofen (ADVIL,MOTRIN) 200 MG tablet Advil Take No date recorded No form recorded No frequency recorded No route recorded No set duration recorded No set duration amount recorded active No dosage strength recorded No dosage strength units of measure recorded      melatonin 10 mg Tab  "Take by mouth.      multivitamin with iron Tab   0 Refill(s)      MYRBETRIQ 50 mg Tb24       needle, disp, 18 G (BD REGULAR BEVEL NEEDLES) 18 gauge x 1 1/2" Ndle   BD Regular Bevel Needles 18 gauge x 1", See Instructions, USE THE 18 GAUGE TO DRAW UP THE TESTOSTERONE EVERY 2 WEEKS, # 25 EA, 4 Refill(s), Pharmacy: [facility], 172, cm, Height/Length Dosing, 05/05/21 8:26:00 CDT, 81.64, kg, Weight Dosing...      red yeast rice 600 mg Cap Take 1,200 mg by mouth.      sildenafiL (VIAGRA) 100 MG tablet Take 100 mg by mouth as needed.      tamsulosin (FLOMAX) 0.4 mg Cap   See Instructions, TAKE ONE CAPSULE BY MOUTH DAILY, # 30 cap(s), 5 Refill(s), Pharmacy: [facility], 172, cm, Height/Length Dosing, 11/18/21 8:21:00 CST, 87.67, kg, Weight Dosing, 11/18/21 8:21:00 CST      testosterone cypionate (DEPOTESTOTERONE CYPIONATE) 100 mg/mL injection Inject 150 mg into the muscle every 14 (fourteen) days.      [DISCONTINUED] fluticasone propionate (FLONASE NASL) Flonase Take No date recorded No form recorded No frequency recorded No route recorded No set duration recorded No set duration amount recorded active No dosage strength recorded No dosage strength units of measure recorded      [DISCONTINUED] testosterone cypionate (DEPOTESTOTERONE CYPIONATE) 100 mg/mL injection Inject 150 mg/mL into the muscle every 14 (fourteen) days.         Allergies  Review of patient's allergies indicates:  No Known Allergies    Physical Examination     Vitals:    05/10/22 0827   BP: (!) 148/81   Pulse: (!) 56   Resp: 16   Temp: 97 °F (36.1 °C)     Physical Exam  Vitals reviewed.   Constitutional:       Appearance: Normal appearance.   HENT:      Head: Normocephalic.      Right Ear: Tympanic membrane normal.      Left Ear: Tympanic membrane normal.      Ears:      Comments: Canals show red streak     Nose: Nose normal.      Mouth/Throat:      Mouth: Mucous membranes are moist.      Pharynx: No oropharyngeal exudate or " posterior oropharyngeal erythema.   Eyes:      Extraocular Movements: Extraocular movements intact.      Pupils: Pupils are equal, round, and reactive to light.   Neck:      Vascular: No carotid bruit.   Cardiovascular:      Rate and Rhythm: Normal rate and regular rhythm.      Pulses:           Dorsalis pedis pulses are 3+ on the right side and 3+ on the left side.        Posterior tibial pulses are 2+ on the right side and 2+ on the left side.   Pulmonary:      Effort: Pulmonary effort is normal.      Breath sounds: Normal breath sounds. No wheezing, rhonchi or rales.   Abdominal:      General: Abdomen is flat.      Palpations: Abdomen is soft. There is no mass.      Tenderness: There is no abdominal tenderness.      Hernia: A hernia is present.      Comments: Diastasis no umbilical hernia, lipoma on the left mid abdomen near flank   Musculoskeletal:         General: Normal range of motion.      Cervical back: Normal range of motion.      Right foot: Bunion present.      Left foot: Bunion present.      Comments: Crepitance to right knee, 1+ edema at foot   Feet:      Right foot:      Protective Sensation: 10 sites tested. 10 sites sensed.      Skin integrity: Skin integrity normal.      Toenail Condition: Right toenails are long.      Left foot:      Protective Sensation: 10 sites tested. 10 sites sensed.      Skin integrity: Skin integrity normal.      Toenail Condition: Left toenails are long.   Lymphadenopathy:      Cervical: No cervical adenopathy.   Skin:     General: Skin is warm and dry.      Comments: R thumb hematoma drained 2/3 nail, left inner forearm near elbow is red elliptical anabelle.    Neurological:      General: No focal deficit present.      Mental Status: He is alert and oriented to person, place, and time.   Psychiatric:         Mood and Affect: Mood normal.         Behavior: Behavior normal.         Thought Content: Thought content normal.         Judgment: Judgment normal.           Assessment  and Plan (including Health Maintenance)      Problem List  Smart Sets  Document Outside HM   :    Plan:     Has reduced exercise and not as close on diet, will do full panel of lab now.   PSA and Testosterone today for hypogonadism on replacement, he's aware of risks. Discussed and he' d like me to do and send to Dr Salmon.     Health Maintenance Due   Topic Date Due    Hepatitis C Screening  Never done    HIV Screening  Never done    Shingles Vaccine (1 of 2) Never done    COVID-19 Vaccine (3 - Booster for Moderna series) 09/22/2021       Problem List Items Addressed This Visit        ENT    Allergic rhinitis    Relevant Medications    fluticasone propionate (FLONASE) 50 mcg/actuation nasal spray       Renal/    BPH (benign prostatic hyperplasia)    Relevant Orders    PSA, Screening       Endocrine    Type 2 diabetes mellitus without complication, without long-term current use of insulin    Relevant Orders    Comprehensive Metabolic Panel    Lipid Panel    Hemoglobin A1C    MICROALBUMIN / CREATININE RATIO URINE      Other Visit Diagnoses     Wellness examination    -  Primary    Encouraged to get back on diet and exercise track    Relevant Medications    fluticasone propionate (FLONASE) 50 mcg/actuation nasal spray    Other Relevant Orders    CBC Auto Differential    Comprehensive Metabolic Panel    Lipid Panel    TSH    PSA, Screening    Hemoglobin A1C    MICROALBUMIN / CREATININE RATIO URINE    URINALYSIS    Testosterone    Low testosterone in male        Was to do day 6 mid admin level but missed, this is day 9 in his 12 day cycle    Long-term current use of testosterone replacement therapy        Obstructive sleep apnea on CPAP        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     Prostate cancer screening        Relevant Orders    PSA, Screening    Elevated blood pressure reading without diagnosis of hypertension        Watch BP at home, call if remains high, has CDL to do  next week          Health Maintenance Topics with due status: Not Due       Topic Last Completion Date    TETANUS VACCINE 10/09/2018    Lipid Panel 05/05/2021    Colorectal Cancer Screening 10/04/2021       Future Appointments   Date Time Provider Department Center   11/10/2022  8:20 AM Thu Soriano MD [facility] PCP            Signature:  Thu Soriano MD  [facility]  [address] [place] [address]    Date of encounter: 5/10/22

## 2022-05-11 ENCOUNTER — TELEPHONE (OUTPATIENT)
Dept: PRIMARY CARE CLINIC | Facility: CLINIC | Age: 62
End: 2022-05-11
Payer: COMMERCIAL

## 2022-05-11 LAB
CREAT UR-MCNC: 56.9 MG/DL (ref 63–166)
MICROALBUMIN UR-MCNC: 16.4 UG/ML
MICROALBUMIN/CREAT RATIO PNL UR: 28.8 MG/GM CR (ref 0–30)

## 2022-05-12 ENCOUNTER — TELEPHONE (OUTPATIENT)
Dept: PRIMARY CARE CLINIC | Facility: CLINIC | Age: 62
End: 2022-05-12
Payer: COMMERCIAL

## 2022-05-12 NOTE — TELEPHONE ENCOUNTER
----- Message from Thu Soriano MD sent at 5/11/2022  5:44 PM CDT -----  I don't see where he's gotten his results    ----- Message -----  From: Thu Soriano MD  Sent: 5/11/2022  10:27 AM CDT  To: Christie Andino Staff    Ok, as expected his sugar is a bit higher and his bad cholesterol too, if he will get back with diet and exercise I won't change anything. More concerning was his PSA is up a little. I'll send that to his urologist, Woodrow.  The rest was all ok.

## 2022-05-13 LAB — TESTOST SERPL-MCNC: 397.32 NG/DL (ref 220.91–715.81)

## 2022-05-18 ENCOUNTER — TELEPHONE (OUTPATIENT)
Dept: PRIMARY CARE CLINIC | Facility: CLINIC | Age: 62
End: 2022-05-18
Payer: COMMERCIAL

## 2022-05-18 NOTE — TELEPHONE ENCOUNTER
----- Message from Thu Soriano MD sent at 5/17/2022  8:12 PM CDT -----  The testosterone was low normal but it's a bit later than the result his urologist wanted, I printed to send to him anyway. His urine doesn't show any change from the diabetes.

## 2022-09-18 ENCOUNTER — HISTORICAL (OUTPATIENT)
Dept: ADMINISTRATIVE | Facility: HOSPITAL | Age: 62
End: 2022-09-18
Payer: COMMERCIAL

## 2022-09-22 ENCOUNTER — PATIENT MESSAGE (OUTPATIENT)
Dept: PRIMARY CARE CLINIC | Facility: CLINIC | Age: 62
End: 2022-09-22
Payer: COMMERCIAL

## 2022-09-27 ENCOUNTER — TELEPHONE (OUTPATIENT)
Dept: PRIMARY CARE CLINIC | Facility: CLINIC | Age: 62
End: 2022-09-27
Payer: COMMERCIAL

## 2022-09-27 NOTE — TELEPHONE ENCOUNTER
----- Message from Jessi Denton sent at 9/27/2022 10:04 AM CDT -----  Regarding: referral  .Type:  Needs Medical Advice    Who Called: pt   Symptoms (please be specific):    How long has patient had these symptoms:    Pharmacy name and phone #:    Would the patient rather a call back or a response via [facility]? Call back   Best Call Back Number: [phone]  Additional Information: pt needs a referral to go to PT in [place]. Joshua/Bertin?

## 2022-10-04 ENCOUNTER — TELEPHONE (OUTPATIENT)
Dept: PRIMARY CARE CLINIC | Facility: CLINIC | Age: 62
End: 2022-10-04

## 2022-10-04 ENCOUNTER — OFFICE VISIT (OUTPATIENT)
Dept: PRIMARY CARE CLINIC | Facility: CLINIC | Age: 62
End: 2022-10-04
Payer: COMMERCIAL

## 2022-10-04 VITALS
HEIGHT: 66 IN | RESPIRATION RATE: 16 BRPM | SYSTOLIC BLOOD PRESSURE: 125 MMHG | HEART RATE: 64 BPM | WEIGHT: 200 LBS | DIASTOLIC BLOOD PRESSURE: 75 MMHG | BODY MASS INDEX: 32.14 KG/M2 | TEMPERATURE: 97 F | OXYGEN SATURATION: 97 %

## 2022-10-04 DIAGNOSIS — E11.9 TYPE 2 DIABETES MELLITUS WITHOUT COMPLICATION, WITHOUT LONG-TERM CURRENT USE OF INSULIN: Primary | ICD-10-CM

## 2022-10-04 DIAGNOSIS — M54.31 RIGHT SIDED SCIATICA: Primary | ICD-10-CM

## 2022-10-04 DIAGNOSIS — I10 PRIMARY HYPERTENSION: ICD-10-CM

## 2022-10-04 DIAGNOSIS — M54.31 SCIATICA OF RIGHT SIDE: ICD-10-CM

## 2022-10-04 PROCEDURE — 3078F DIAST BP <80 MM HG: CPT | Mod: CPTII,,, | Performed by: INTERNAL MEDICINE

## 2022-10-04 PROCEDURE — 3066F NEPHROPATHY DOC TX: CPT | Mod: CPTII,,, | Performed by: INTERNAL MEDICINE

## 2022-10-04 PROCEDURE — 3066F PR DOCUMENTATION OF TREATMENT FOR NEPHROPATHY: ICD-10-PCS | Mod: CPTII,,, | Performed by: INTERNAL MEDICINE

## 2022-10-04 PROCEDURE — 99213 PR OFFICE/OUTPT VISIT, EST, LEVL III, 20-29 MIN: ICD-10-PCS | Mod: ,,, | Performed by: INTERNAL MEDICINE

## 2022-10-04 PROCEDURE — 1159F PR MEDICATION LIST DOCUMENTED IN MEDICAL RECORD: ICD-10-PCS | Mod: CPTII,,, | Performed by: INTERNAL MEDICINE

## 2022-10-04 PROCEDURE — 3078F PR MOST RECENT DIASTOLIC BLOOD PRESSURE < 80 MM HG: ICD-10-PCS | Mod: CPTII,,, | Performed by: INTERNAL MEDICINE

## 2022-10-04 PROCEDURE — 1160F PR REVIEW ALL MEDS BY PRESCRIBER/CLIN PHARMACIST DOCUMENTED: ICD-10-PCS | Mod: CPTII,,, | Performed by: INTERNAL MEDICINE

## 2022-10-04 PROCEDURE — 3008F BODY MASS INDEX DOCD: CPT | Mod: CPTII,,, | Performed by: INTERNAL MEDICINE

## 2022-10-04 PROCEDURE — 1160F RVW MEDS BY RX/DR IN RCRD: CPT | Mod: CPTII,,, | Performed by: INTERNAL MEDICINE

## 2022-10-04 PROCEDURE — 3008F PR BODY MASS INDEX (BMI) DOCUMENTED: ICD-10-PCS | Mod: CPTII,,, | Performed by: INTERNAL MEDICINE

## 2022-10-04 PROCEDURE — 3074F PR MOST RECENT SYSTOLIC BLOOD PRESSURE < 130 MM HG: ICD-10-PCS | Mod: CPTII,,, | Performed by: INTERNAL MEDICINE

## 2022-10-04 PROCEDURE — 99213 OFFICE O/P EST LOW 20 MIN: CPT | Mod: ,,, | Performed by: INTERNAL MEDICINE

## 2022-10-04 PROCEDURE — 3061F NEG MICROALBUMINURIA REV: CPT | Mod: CPTII,,, | Performed by: INTERNAL MEDICINE

## 2022-10-04 PROCEDURE — 3074F SYST BP LT 130 MM HG: CPT | Mod: CPTII,,, | Performed by: INTERNAL MEDICINE

## 2022-10-04 PROCEDURE — 3061F PR NEG MICROALBUMINURIA RESULT DOCUMENTED/REVIEW: ICD-10-PCS | Mod: CPTII,,, | Performed by: INTERNAL MEDICINE

## 2022-10-04 PROCEDURE — 1159F MED LIST DOCD IN RCRD: CPT | Mod: CPTII,,, | Performed by: INTERNAL MEDICINE

## 2022-10-04 RX ORDER — MUPIROCIN 20 MG/G
0.25 OINTMENT TOPICAL 3 TIMES DAILY
COMMUNITY
Start: 2022-08-16 | End: 2023-09-05

## 2022-10-04 RX ORDER — ANASTROZOLE 1 MG/1
1 TABLET ORAL
COMMUNITY
Start: 2022-09-14 | End: 2023-10-17 | Stop reason: CLARIF

## 2022-10-04 RX ORDER — SOLIFENACIN SUCCINATE 5 MG/1
5 TABLET, FILM COATED ORAL DAILY
COMMUNITY
Start: 2022-09-23

## 2022-10-04 NOTE — PROGRESS NOTES
Thu Soriano MD   [address] [facility] [place] [address]     PATIENT NAME: Jason Choi  : 1960  DATE: 10/4/22  MRN: [id number]      Billing Provider: Thu Soriano MD  Level of Service:   Patient PCP Information       Provider PCP Type    Thu Soriano MD General            Reason for Visit / Chief Complaint: 6 months follow up (C/O Sciatic pain.)       Update PCP  Update Chief Complaint         History of Present Illness / Problem Focused Workflow     Jason Choi presents to the clinic with 6 months follow up (C/O Sciatic pain.)     Here for follow up, he's been dealing with sciatica for a while now. There was no specific injury he recalls. He was going weekly to the chiropractor and he just changed him to twice  a week. He went yesterday and it was really hurting last night. His boss wants him to go to PT so he will start that next week in [place] at [facility]. He is hurting in the right leg. It hurts when he is laying on his side so he will have to keep switching. He can't do on his back as his CPAP leaks when he lays flat. He will be flying to Kelly's son's wedding in [place] in a few weeks, he's hoping to have a couple of treatments out of the way from PT by then.       Review of Systems     Review of Systems   HENT: Negative.     Respiratory: Negative.     Cardiovascular:         BP was up in cardiology office seeing the NP, he had sat 2 hours and was angry. They wanted to start a BP med but he didn't pick it up. His BP was low the next day at home. He's going to make appointments there late so he doesn't feel rushed. BP was high here when he was talking to my nurse but it came down when he was quiet and calm.    Gastrointestinal: Negative.    Endocrine:        Not testing sugar, not as active due to the pain   Genitourinary:         Saw urology everything was stable.    Skin: Negative.         Dermatologist had tried burning the left arm red area, it didn't change   Neurological:          Tingling/pain on the right side   Hematological:         Doesn't want flu shot today, he's going to try to donate this week and he had them tell him AIDS was showing right after he took a flu shot last year. He went back and they retested and it was negative, if it's negative this time he will always avoid after vaccines.      Medical / Social / Family History     Past Medical History:   Diagnosis Date    BPH (benign prostatic hyperplasia)     Calcaneal spur of foot     Decreased testosterone level     Diverticulosis     Lipoma of forearm     Male erectile dysfunction, unspecified     LAWSON on CPAP     Tubular adenoma of colon     Type 2 diabetes mellitus without complications        Past Surgical History:   Procedure Laterality Date    COLON BIOPSY  10/04/2021    LIPOMA RESECTION         Social History  Mr. Choi      reports that he has never smoked. He has never used smokeless tobacco. He reports current alcohol use of about 2.0 standard drinks per week. He reports that he does not use drugs.    Family History  Mr.'s Choi   family history includes Atrial fibrillation in his brother; COPD in his mother; Diabetes in his father, mother, and sister; Hypertension in his brother, father, and sister; Kidney disease in his father; Osteoarthritis in his father; Prostate cancer in his father; Throat cancer in his sister.    Medications and Allergies     Medications  Outpatient Medications Marked as Taking for the 10/4/22 encounter (Office Visit) with Thu Soriano MD   Medication Sig Dispense Refill    anastrozole (ARIMIDEX) 1 mg Tab Take 1 mg by mouth every 7 days.      ascorbic acid/zinc (ZINC WITH VITAMIN C ORAL) Take by mouth.      aspirin 81 mg Cap aspirin Take No date recorded No form recorded No frequency recorded No route recorded No set duration recorded No set duration amount recorded active No dosage strength recorded No dosage strength units of measure recorded      azelastine (ASTELIN) 137 mcg (0.1 %)  "nasal spray by Nasal route.      docosahexaenoic acid/epa (FISH OIL ORAL) Take 500 mg by mouth once daily at 6am.      fluticasone propionate (FLONASE) 50 mcg/actuation nasal spray 1 spray (50 mcg total) by Each Nostril route once daily. 16 g 3    ibuprofen (ADVIL,MOTRIN) 200 MG tablet Advil Take No date recorded No form recorded No frequency recorded No route recorded No set duration recorded No set duration amount recorded active No dosage strength recorded No dosage strength units of measure recorded      multivitamin with iron Tab   0 Refill(s)      MYRBETRIQ 50 mg Tb24       needle, disp, 18 G (BD REGULAR BEVEL NEEDLES) 18 gauge x 1 1/2" Ndle   BD Regular Bevel Needles 18 gauge x 1", See Instructions, USE THE 18 GAUGE TO DRAW UP THE TESTOSTERONE EVERY 2 WEEKS, # 25 EA, 4 Refill(s), Pharmacy: [facility], 172, cm, Height/Length Dosing, 05/05/21 8:26:00 CDT, 81.64, kg, Weight Dosing...      red yeast rice 600 mg Cap Take 1,200 mg by mouth.      sildenafiL (VIAGRA) 100 MG tablet Take 100 mg by mouth as needed.      solifenacin (VESICARE) 5 MG tablet Take 5 mg by mouth once daily.      tamsulosin (FLOMAX) 0.4 mg Cap TAKE ONE CAPSULE BY MOUTH DAILY 30 capsule 5    testosterone cypionate (DEPOTESTOTERONE CYPIONATE) 100 mg/mL injection Inject 150 mg into the muscle every 14 (fourteen) days.         Allergies  Review of patient's allergies indicates:  No Known Allergies    Physical Examination     Vitals:    10/04/22 0811   BP: 125/75   Pulse: 64   Resp: 16   Temp: 97 °F (36.1 °C)     Physical Exam  Vitals reviewed.   Constitutional:       Appearance: Normal appearance.   HENT:      Head: Normocephalic.   Cardiovascular:      Rate and Rhythm: Normal rate and regular rhythm.   Pulmonary:      Effort: Pulmonary effort is normal.      Breath sounds: Normal breath sounds. No wheezing.   Abdominal:      General: Abdomen is flat. Bowel sounds are normal.      Palpations: Abdomen is soft.      Tenderness: There is " no abdominal tenderness. There is no guarding.   Skin:     General: Skin is warm and dry.      Comments: Red area left forearm at the elbow   Neurological:      Mental Status: He is alert.      Sensory: No sensory deficit.      Motor: No weakness.      Gait: Gait abnormal.      Comments: Sensory intact, he has pain when I do supine SLR on the right but not on raising, only when I am lowering the leg. Left is negative.          Assessment and Plan (including Health Maintenance)      Problem List  Smart Sets  Document Outside HM   :    Plan:     He will do flu shot but doesn't want now as he plans to donate for his testosterone therapy.     Health Maintenance Due   Topic Date Due    Hepatitis C Screening  Never done    HIV Screening  Never done    Shingles Vaccine (1 of 2) Never done    COVID-19 Vaccine (3 - Booster for Moderna series) 06/17/2021    Influenza Vaccine (1) 09/01/2022       Problem List Items Addressed This Visit          Endocrine    Type 2 diabetes mellitus without complication, without long-term current use of insulin - Primary       Health Maintenance Topics with due status: Not Due       Topic Last Completion Date    TETANUS VACCINE 10/09/2018    Colorectal Cancer Screening 10/04/2021    PROSTATE-SPECIFIC ANTIGEN 05/10/2022    Lipid Panel 05/10/2022       No future appointments.           Signature:  Thu Soriano MD  [facility]  [id number] [place] [id number]    Date of encounter: 10/4/22

## 2022-10-04 NOTE — TELEPHONE ENCOUNTER
He said he had PT set up from work. I will put in referral but it will go to his insurance so there is no way to know if it will happen this week.

## 2022-10-04 NOTE — TELEPHONE ENCOUNTER
----- Message from Titi Jason sent at 10/4/2022  2:46 PM CDT -----  Type:  Needs Medical Advice    Who Called: Jason  Symptoms (please be specific):    How long has patient had these symptoms:    Pharmacy name and phone #:    Would the patient rather a call back or a response via [facility]?   Best Call Back Number: [phone]  Additional Information: Pt called cause needs referral to go to physical therapy at  [facility] in [place]. He wants to know can he get scheduled for this week if possible.He also stated he's in a lot pain also that's why he can't wait until next week. Pt would like a call back.

## 2022-10-05 LAB
BUN SERPL-MCNC: 19 MG/DL (ref 8–27)
BUN/CREAT SERPL: 17 (ref 10–24)
CALCIUM SERPL-MCNC: 10 MG/DL (ref 8.6–10.2)
CHLORIDE SERPL-SCNC: 102 MMOL/L (ref 96–106)
CO2 SERPL-SCNC: 24 MMOL/L (ref 20–29)
CREAT SERPL-MCNC: 1.12 MG/DL (ref 0.76–1.27)
EST. GFR  (NO RACE VARIABLE): 74 ML/MIN/1.73
GLUCOSE SERPL-MCNC: 101 MG/DL (ref 70–99)
HBA1C MFR BLD: 6.1 % (ref 4.8–5.6)
POTASSIUM SERPL-SCNC: 4.6 MMOL/L (ref 3.5–5.2)
SODIUM SERPL-SCNC: 144 MMOL/L (ref 134–144)

## 2022-10-06 ENCOUNTER — TELEPHONE (OUTPATIENT)
Dept: PRIMARY CARE CLINIC | Facility: CLINIC | Age: 62
End: 2022-10-06
Payer: COMMERCIAL

## 2022-10-06 NOTE — TELEPHONE ENCOUNTER
----- Message from Thu Soriano MD sent at 10/5/2022  6:25 PM CDT -----  Sugar is still pretty good but the A1C is rising so he's got to get back on the diet and find an exercise his back will let him do.

## 2022-10-20 ENCOUNTER — TELEPHONE (OUTPATIENT)
Dept: PRIMARY CARE CLINIC | Facility: CLINIC | Age: 62
End: 2022-10-20
Payer: COMMERCIAL

## 2022-10-20 NOTE — TELEPHONE ENCOUNTER
----- Message from Thu Soriano MD sent at 10/17/2022  6:48 PM CDT -----  Can we get any celestone?  ----- Message -----  From: Monserrat Wallis LPN  Sent: 10/17/2022  11:40 AM CDT  To: Thu Soriano MD    Pt stated that he is having trouble with sciatic pain in his right leg. Pt would to get a steroid shot for his pain, he stated that he was been doing PT 3 times a  week,  but will be leaving to go on vacation, and would like some relief.     Please advise  ----- Message -----  From: Federico Godfrey  Sent: 10/17/2022  11:30 AM CDT  To: Christie Andino Staff    .Type:  Needs Medical Advice    Who Called: Jason  Symptoms (please be specific):    How long has patient had these symptoms:    Pharmacy name and phone #:    Would the patient rather a call back or a response via [facility]?   Best Call Back Number: [phone]  Additional Information: He would like to speak with nurse about getting a Cortizone shot for his leg as it is not getting any better.

## 2022-10-24 NOTE — TELEPHONE ENCOUNTER
Spoke to patient he is out of town will call when he gets back he is requesting steroid injection in the back.

## 2022-10-31 ENCOUNTER — TELEPHONE (OUTPATIENT)
Dept: PRIMARY CARE CLINIC | Facility: CLINIC | Age: 62
End: 2022-10-31
Payer: COMMERCIAL

## 2022-10-31 NOTE — TELEPHONE ENCOUNTER
A little update from [place]..patient went to [facility] and had XR done which showed arthiritis in his hip bone area. They sent him home with prednisone & Tylenol 650mg. Urgent care referred him to see Dr. Sean Haider- he is schedule to see him on December 8. Between now and December 8 he is supposed to be getting an MRI done and will discuss all results at appt with Dr. Haider.

## 2022-11-01 ENCOUNTER — TELEPHONE (OUTPATIENT)
Dept: PRIMARY CARE CLINIC | Facility: CLINIC | Age: 62
End: 2022-11-01
Payer: COMMERCIAL

## 2022-11-01 DIAGNOSIS — M54.31 RIGHT SCIATIC NERVE PAIN: ICD-10-CM

## 2022-11-01 DIAGNOSIS — M54.30 SCIATICA, UNSPECIFIED LATERALITY: Primary | ICD-10-CM

## 2022-11-01 NOTE — TELEPHONE ENCOUNTER
----- Message from Falguni Baez sent at 11/1/2022  1:36 PM CDT -----  Regarding: PT orders  Type:  Needs Medical Advice    Who Called: patient  Symptoms (please be specific):    How long has patient had these symptoms:    Pharmacy name and phone #:    Would the patient rather a call back or a response via [facility]?   Best Call Back Number: [phone]  Additional Information: Patient needs a new PT order to continue his therapy for another month. Please contact patient to confirm new order was sent for his Physical Therapy. Last session is tomorrow but needs a new order.

## 2022-11-10 ENCOUNTER — PATIENT MESSAGE (OUTPATIENT)
Dept: PRIMARY CARE CLINIC | Facility: CLINIC | Age: 62
End: 2022-11-10
Payer: COMMERCIAL

## 2022-11-15 ENCOUNTER — TELEPHONE (OUTPATIENT)
Dept: PRIMARY CARE CLINIC | Facility: CLINIC | Age: 62
End: 2022-11-15
Payer: COMMERCIAL

## 2022-11-15 DIAGNOSIS — R71.8 MICROCYTOSIS: ICD-10-CM

## 2022-11-15 DIAGNOSIS — I10 ESSENTIAL HYPERTENSION: Primary | ICD-10-CM

## 2022-11-15 DIAGNOSIS — E11.9 WELL CONTROLLED TYPE 2 DIABETES MELLITUS: ICD-10-CM

## 2022-11-15 RX ORDER — HYDROCODONE BITARTRATE AND ACETAMINOPHEN 5; 325 MG/1; MG/1
1 TABLET ORAL EVERY 4 HOURS PRN
COMMUNITY
Start: 2022-11-10

## 2022-11-15 NOTE — TELEPHONE ENCOUNTER
They also want a CXR and lab. I put in all but the clotting studies, they should be closer to surgery.

## 2022-11-15 NOTE — TELEPHONE ENCOUNTER
----- Message from Keya Falk sent at 11/15/2022  8:31 AM CST -----  Regarding: Return Call  .Type:  Patient Returning Call    Who Called:pt  Who Left Message for Patient:Laura  Does the patient know what this is regarding?:asking to speak with Laura  Would the patient rather a call back or a response via [facility]? Call Back   Best Call Back Number:[phone]  Additional Information: pt is wanting to go get his EKG done today, he also stated paperwork was drop off on yesterday about an EKG

## 2022-11-16 ENCOUNTER — PATIENT MESSAGE (OUTPATIENT)
Dept: PRIMARY CARE CLINIC | Facility: CLINIC | Age: 62
End: 2022-11-16
Payer: COMMERCIAL

## 2022-11-17 ENCOUNTER — HOSPITAL ENCOUNTER (OUTPATIENT)
Dept: CARDIOLOGY | Facility: HOSPITAL | Age: 62
Discharge: HOME | End: 2022-11-17
Attending: INTERNAL MEDICINE
Payer: COMMERCIAL

## 2022-11-17 ENCOUNTER — PATIENT MESSAGE (OUTPATIENT)
Dept: PRIMARY CARE CLINIC | Facility: CLINIC | Age: 62
End: 2022-11-17
Payer: COMMERCIAL

## 2022-11-17 ENCOUNTER — HOSPITAL ENCOUNTER (OUTPATIENT)
Dept: RADIOLOGY | Facility: HOSPITAL | Age: 62
Discharge: HOME | End: 2022-11-17
Attending: INTERNAL MEDICINE
Payer: COMMERCIAL

## 2022-11-17 ENCOUNTER — TELEPHONE (OUTPATIENT)
Dept: PRIMARY CARE CLINIC | Facility: CLINIC | Age: 62
End: 2022-11-17
Payer: COMMERCIAL

## 2022-11-17 DIAGNOSIS — E11.9 WELL CONTROLLED TYPE 2 DIABETES MELLITUS: ICD-10-CM

## 2022-11-17 DIAGNOSIS — I10 ESSENTIAL HYPERTENSION: ICD-10-CM

## 2022-11-17 PROCEDURE — 93041 RHYTHM ECG TRACING: CPT | Mod: 59

## 2022-11-17 PROCEDURE — 93010 ELECTROCARDIOGRAM REPORT: CPT | Mod: ,,, | Performed by: INTERNAL MEDICINE

## 2022-11-17 PROCEDURE — 71046 X-RAY EXAM CHEST 2 VIEWS: CPT | Mod: TC

## 2022-11-17 PROCEDURE — 93010 EKG 12-LEAD: ICD-10-PCS | Mod: ,,, | Performed by: INTERNAL MEDICINE

## 2022-11-17 PROCEDURE — 93005 ELECTROCARDIOGRAM TRACING: CPT

## 2022-11-18 NOTE — TELEPHONE ENCOUNTER
----- Message from Thu Soriano MD sent at 11/17/2022  6:10 PM CST -----  Protein is elevated, can be from inflammation. We'll recheck after surgery. Diabetes is doing great. Blood count is ok, the cells are pale so keep some iron in diet.   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 That can lead to the heart slowing down too much with a stress like anesthesia so I want the cardiologist looking it over at least.

## 2022-11-21 ENCOUNTER — HOSPITAL ENCOUNTER (OUTPATIENT)
Dept: RADIOLOGY | Facility: HOSPITAL | Age: 62
Discharge: HOME | End: 2022-11-21
Attending: INTERNAL MEDICINE
Payer: COMMERCIAL

## 2022-11-21 ENCOUNTER — TELEPHONE (OUTPATIENT)
Dept: PRIMARY CARE CLINIC | Facility: CLINIC | Age: 62
End: 2022-11-21
Payer: COMMERCIAL

## 2022-11-21 DIAGNOSIS — Z82.49 FAMILY HISTORY OF EARLY CAD: ICD-10-CM

## 2022-11-21 DIAGNOSIS — R94.31 ABNORMAL EKG: ICD-10-CM

## 2022-11-21 DIAGNOSIS — R94.31 ABNORMAL EKG: Primary | ICD-10-CM

## 2022-11-21 PROCEDURE — 75571 CT HRT W/O DYE W/CA TEST: CPT | Mod: TC

## 2022-11-21 NOTE — TELEPHONE ENCOUNTER
----- Message from Falguni Baez sent at 11/21/2022  1:32 PM CST -----  Regarding: pt call  Type:  Needs Medical Advice    Who Called: patient  Symptoms (please be specific):    How long has patient had these symptoms:    Pharmacy name and phone #:    Would the patient rather a call back or a response via [facility]?   Best Call Back Number: [phone]  Additional Information: Ms. Sanchez please call patient back.

## 2022-11-23 ENCOUNTER — TELEPHONE (OUTPATIENT)
Dept: PRIMARY CARE CLINIC | Facility: CLINIC | Age: 62
End: 2022-11-23
Payer: COMMERCIAL

## 2022-11-23 NOTE — TELEPHONE ENCOUNTER
----- Message from Titi Jason sent at 11/23/2022  1:26 PM CST -----  Regarding: call back  .Type:  Needs Medical Advice    Who Called: dayanara  Would the patient rather a call back or a response via [employer]?   Best Call Back Number: [phone]  Additional Information: Pt would like a call back from tone. He said he was a few questions.

## 2022-11-28 ENCOUNTER — PATIENT MESSAGE (OUTPATIENT)
Dept: PRIMARY CARE CLINIC | Facility: CLINIC | Age: 62
End: 2022-11-28
Payer: COMMERCIAL

## 2022-11-30 DIAGNOSIS — R94.31 ABNORMAL EKG: Primary | ICD-10-CM

## 2022-12-27 ENCOUNTER — DOCUMENTATION ONLY (OUTPATIENT)
Dept: FAMILY MEDICINE | Facility: CLINIC | Age: 62
End: 2022-12-27
Payer: COMMERCIAL

## 2022-12-28 ENCOUNTER — PATIENT OUTREACH (OUTPATIENT)
Dept: ADMINISTRATIVE | Facility: HOSPITAL | Age: 62
End: 2022-12-28
Payer: COMMERCIAL

## 2023-01-04 DIAGNOSIS — R79.89 LOW TESTOSTERONE IN MALE: Primary | ICD-10-CM

## 2023-01-04 RX ORDER — TESTOSTERONE CYPIONATE 1000 MG/10ML
150 INJECTION, SOLUTION INTRAMUSCULAR
Qty: 1 ML | Refills: 3 | Status: SHIPPED | OUTPATIENT
Start: 2023-01-04 | End: 2023-01-11 | Stop reason: SDUPTHER

## 2023-01-05 ENCOUNTER — TELEPHONE (OUTPATIENT)
Dept: PRIMARY CARE CLINIC | Facility: CLINIC | Age: 63
End: 2023-01-05
Payer: COMMERCIAL

## 2023-01-05 NOTE — TELEPHONE ENCOUNTER
----- Message from Malorie Lovett MA sent at 1/5/2023  9:41 AM CST -----  Regarding: refill not sign  .Type:  RX Refill Request    Who Called: pt    Refill or New Rx:testosterone cypionate (DEPOTESTOTERONE CYPIONATE) 100 mg/mL injection        Preferred Pharmacy with phone number: [facility] [phone]    Local or Mail Order:local    Best Call Back Number: [phone]    Additional Information: pt went to pharmacy since yesterday and his refill was not ready he ask for several refills because pharmacy runs low

## 2023-01-05 NOTE — TELEPHONE ENCOUNTER
----- Message from Malorie Lovett MA sent at 1/5/2023  9:41 AM CST -----  Regarding: refill not sign  .Type:  RX Refill Request    Who Called: pt    Refill or New Rx:testosterone cypionate (DEPOTESTOTERONE CYPIONATE) 100 mg/mL injection        Preferred Pharmacy with phone number: [facility] [phone]    Local or Mail Order:local    Best Call Back Number: [phone]    Additional Information: pt went to pharmacy since yesterday and his refill was not ready he ask for several refills because pharmacy runs low

## 2023-01-10 DIAGNOSIS — R79.89 LOW TESTOSTERONE IN MALE: ICD-10-CM

## 2023-01-10 RX ORDER — TESTOSTERONE CYPIONATE 1000 MG/10ML
INJECTION, SOLUTION INTRAMUSCULAR
Qty: 10 ML | Refills: 0 | OUTPATIENT
Start: 2023-01-10

## 2023-01-10 NOTE — TELEPHONE ENCOUNTER
----- Message from Titi Jason sent at 1/10/2023  2:49 PM CST -----  Regarding: call back  .Type:  Needs Medical Advice    Who Called: dayanara  Would the patient rather a call back or a response via [email or web address]? [email or web address]  Best Call Back Number: [phone]  Additional Information: pt medication for testosterone cypionate (DEPOTESTOTERONE CYPIONATE) 100 mg/mL injection was printed instead of e-prescribed pls resend it to the pharmacy. Pls call back pt when sent he is waiting.

## 2023-01-11 DIAGNOSIS — R79.89 LOW TESTOSTERONE IN MALE: ICD-10-CM

## 2023-01-11 RX ORDER — TESTOSTERONE CYPIONATE 1000 MG/10ML
150 INJECTION, SOLUTION INTRAMUSCULAR
Qty: 1 ML | Refills: 3 | Status: SHIPPED | OUTPATIENT
Start: 2023-01-11 | End: 2023-01-12 | Stop reason: SDUPTHER

## 2023-01-12 DIAGNOSIS — R79.89 LOW TESTOSTERONE IN MALE: ICD-10-CM

## 2023-01-12 RX ORDER — TESTOSTERONE CYPIONATE 1000 MG/10ML
150 INJECTION, SOLUTION INTRAMUSCULAR
Qty: 1 ML | Refills: 3 | Status: SHIPPED | OUTPATIENT
Start: 2023-01-12 | End: 2023-01-25

## 2023-01-12 NOTE — PROGRESS NOTES
Patient called back, again pharmacy is not getting his testosterone. [facility] is out. Printed again

## 2023-01-25 ENCOUNTER — TELEPHONE (OUTPATIENT)
Dept: PRIMARY CARE CLINIC | Facility: CLINIC | Age: 63
End: 2023-01-25
Payer: COMMERCIAL

## 2023-01-25 DIAGNOSIS — R79.89 LOW TESTOSTERONE IN MALE: ICD-10-CM

## 2023-01-25 NOTE — TELEPHONE ENCOUNTER
Testosterone 100 are unavailable every where so pt's asking if he can get the 200mg to [facility] or [facility] they both have the 200mg.

## 2023-01-30 ENCOUNTER — DOCUMENTATION ONLY (OUTPATIENT)
Dept: ADMINISTRATIVE | Facility: HOSPITAL | Age: 63
End: 2023-01-30
Payer: COMMERCIAL

## 2023-03-28 ENCOUNTER — TELEPHONE (OUTPATIENT)
Dept: PRIMARY CARE CLINIC | Facility: CLINIC | Age: 63
End: 2023-03-28
Payer: COMMERCIAL

## 2023-04-03 NOTE — PROGRESS NOTES
Thu Soriano MD   [id number] [id number]     Patient ID: [id number]     Chief Complaint: Follow-up (6 month follow up )        HPI:     Jason Choi is a 62 y.o. male here today for a follow up of HTN, DM, DJD, and hypogonadism. He is going back to ortho to see if he can be released now to go back to work from the hip replace. He has seen Dr Salmon and he wants lab for the hypogonadism. No other complaints today.       Subjective:     Review of Systems   Constitutional:         Weight came back with hip surgery he's been off the exercise bike.    Respiratory: Negative.     Cardiovascular:         Fernando see Nahomy soon. No issues.   Gastrointestinal:  Positive for blood in stool.        His hernia was hurting after Luis Antonio pushed it in but now it's fine. He had gotten constipated post op and after he had surgery, he had episode of blood in stool with something that looked like tissue.    Musculoskeletal:         Hip is pain free since surgery with Dr Cochran. His knee and leg would hurt before the surgery but now he's doing good.    Neurological: Negative.    Psychiatric/Behavioral: Negative.       Past Medical History:   Diagnosis Date    BPH (benign prostatic hyperplasia)     Calcaneal spur of foot     Decreased testosterone level     Diverticulosis     Lipoma of forearm     Male erectile dysfunction, unspecified     LAWSON on CPAP     Tubular adenoma of colon     Type 2 diabetes mellitus without complications         Past Surgical History:   Procedure Laterality Date    COLON BIOPSY  10/04/2021    HIP REPLACEMENT ARTHROPLASTY Right 12/20/2022    LIPOMA RESECTION         Family History   Problem Relation Age of Onset    Diabetes Mother     COPD Mother     Hypertension Father     Prostate cancer Father     Kidney disease Father     Diabetes Father     Osteoarthritis Father     Throat cancer Sister     Hypertension Sister     Diabetes Sister     Hypertension Brother     Atrial fibrillation Brother      "    Social History     Socioeconomic History    Marital status:    Tobacco Use    Smoking status: Never    Smokeless tobacco: Never   Substance and Sexual Activity    Alcohol use: Yes     Alcohol/week: 2.0 standard drinks     Types: 2 Cans of beer per week    Drug use: Never       Review of patient's allergies indicates:  No Known Allergies    Outpatient Medications Marked as Taking for the 4/4/23 encounter (Office Visit) with Thu Soriano MD   Medication Sig Dispense Refill    ascorbic acid/zinc (ZINC WITH VITAMIN C ORAL) Take by mouth.      aspirin 81 mg Cap aspirin Take No date recorded No form recorded No frequency recorded No route recorded No set duration recorded No set duration amount recorded active No dosage strength recorded No dosage strength units of measure recorded      BD LUER-JACKELINE SYRINGE 3 mL 23 x 1" Syrg use to inject testosterone every 2 weeks 25 each 4    BD REGULAR BEVEL NEEDLES 18 gauge x 1" Ndle USE THE 18 GAUGE TO DRAW UP THE TESTOSTERONE EVERY 2 WEEKS 25 each 4    diphenhydramine HCl (UNISOM, DIPHENHYDRAMINE, ORAL) Take by mouth nightly as needed.      docosahexaenoic acid/epa (FISH OIL ORAL) Take 500 mg by mouth once daily at 6am.      fluticasone propionate (FLONASE) 50 mcg/actuation nasal spray 1 spray (50 mcg total) by Each Nostril route once daily. 16 g 3    ibuprofen (ADVIL,MOTRIN) 200 MG tablet Advil Take No date recorded No form recorded No frequency recorded No route recorded No set duration recorded No set duration amount recorded active No dosage strength recorded No dosage strength units of measure recorded      multivitamin with iron Tab   0 Refill(s)      MYRBETRIQ 50 mg Tb24       needle, disp, 18 G (BD REGULAR BEVEL NEEDLES) 18 gauge x 1 1/2" Ndle   BD Regular Bevel Needles 18 gauge x 1", See Instructions, USE THE 18 GAUGE TO DRAW UP THE TESTOSTERONE EVERY 2 WEEKS, # 25 EA, 4 Refill(s), Pharmacy: [facility], 172, cm, Height/Length Dosing, 05/05/21 " "8:26:00 CDT, 81.64, kg, Weight Dosing...      red yeast rice 600 mg Cap Take 1,200 mg by mouth.      sildenafiL (VIAGRA) 100 MG tablet Take 100 mg by mouth as needed.      solifenacin (VESICARE) 5 MG tablet Take 5 mg by mouth once daily.      tamsulosin (FLOMAX) 0.4 mg Cap TAKE ONE CAPSULE BY MOUTH DAILY 30 capsule 5    testosterone cypionate (DEPOTESTOTERONE CYPIONATE) 200 mg/mL injection Inject 150 mg into the muscle every 14 (fourteen) days. 10 mL 0    TUMERIC-GING-OLIVE-OREG-CAPRYL ORAL Take by mouth.         Patient Care Team:  Thu Soriano MD as PCP - General (Internal Medicine)  Cali Salmon MD as Consulting Physician (Urology)  Federico Ortega MD as Consulting Physician (Gastroenterology)  Roque Bojorquez MD as Consulting Physician (Otolaryngology)  Lizzie Corea MD as Consulting Physician (Cardiology)  Jose Raul Nolen as Consulting Physician (Dermatology)  Joseph De La Vega MD as Consulting Physician (Gastroenterology)  KALLIE Cochran MD (Orthopedic Surgery)       Objective:     BP (!) 143/76 (BP Location: Left arm, Patient Position: Sitting, BP Method: Large (Automatic))   Pulse (!) 55   Temp 98.2 °F (36.8 °C) (Oral)   Resp 18   Ht 5' 6" (1.676 m)   Wt 93.4 kg (206 lb)   SpO2 98%   BMI 33.25 kg/m²     Physical Exam  Vitals reviewed.   Constitutional:       Appearance: He is obese.   Cardiovascular:      Rate and Rhythm: Normal rate and regular rhythm.   Pulmonary:      Effort: Pulmonary effort is normal.      Breath sounds: Normal breath sounds.   Abdominal:      General: Abdomen is flat.      Tenderness: There is no abdominal tenderness.      Comments: Several lipomas, there is hernia on the left   Skin:     General: Skin is warm and dry.   Neurological:      Mental Status: He is alert.   Psychiatric:         Mood and Affect: Mood normal.         Behavior: Behavior normal.         Thought Content: Thought content normal.         Judgment: Judgment normal.         Assessment/Problems: "       ICD-10-CM ICD-9-CM   1. Primary hypertension  I10 401.9   2. Type 2 diabetes mellitus without complication, without long-term current use of insulin  E11.9 250.00   3. Long-term current use of testosterone replacement therapy  Z79.890 V58.69   4. Hypogonadism in male  E29.1 257.2   5. History of total right hip replacement  Z96.641 V43.64        Plan:     1. Primary hypertension  Comments:  Elevated today, at Urology it was a little better. It's always better at home, he'll watch at home and bring us readings, it's usually 120's  Orders:  -     Comprehensive Metabolic Panel    2. Type 2 diabetes mellitus without complication, without long-term current use of insulin  Comments:  Recheck with weight gain  Orders:  -     Comprehensive Metabolic Panel  -     Hemoglobin A1C    3. Long-term current use of testosterone replacement therapy  Comments:  Lab today  Orders:  -     CBC Auto Differential  -     Comprehensive Metabolic Panel  -     Testosterone  -     Hemoglobin A1C  -     Estradiol; Future; Expected date: 04/04/2023  -     PSA, Screening    4. Hypogonadism in male  Comments:  On testosterone, we did get it finally, he saw Lidya and will go now annually.   Orders:  -     CBC Auto Differential  -     Comprehensive Metabolic Panel  -     Testosterone  -     Hemoglobin A1C  -     Estradiol; Future; Expected date: 04/04/2023  -     PSA, Screening    5. History of total right hip replacement  Comments:  Doing well, likely to have release             Follow up in about 6 months (around 10/4/2023) for Wellness. In addition to their scheduled follow up, the patient has also been instructed to follow up on as needed basis.     Signature:  Thu Soriano MD  [facility]  [id number] [id number]

## 2023-04-04 ENCOUNTER — TELEPHONE (OUTPATIENT)
Dept: PRIMARY CARE CLINIC | Facility: CLINIC | Age: 63
End: 2023-04-04

## 2023-04-04 ENCOUNTER — OFFICE VISIT (OUTPATIENT)
Dept: PRIMARY CARE CLINIC | Facility: CLINIC | Age: 63
End: 2023-04-04
Payer: COMMERCIAL

## 2023-04-04 VITALS
SYSTOLIC BLOOD PRESSURE: 127 MMHG | HEIGHT: 66 IN | WEIGHT: 206 LBS | BODY MASS INDEX: 33.11 KG/M2 | HEART RATE: 55 BPM | DIASTOLIC BLOOD PRESSURE: 71 MMHG | TEMPERATURE: 98 F | RESPIRATION RATE: 18 BRPM | OXYGEN SATURATION: 98 %

## 2023-04-04 DIAGNOSIS — Z79.890 LONG-TERM CURRENT USE OF TESTOSTERONE REPLACEMENT THERAPY: ICD-10-CM

## 2023-04-04 DIAGNOSIS — Z96.641 HISTORY OF TOTAL RIGHT HIP REPLACEMENT: ICD-10-CM

## 2023-04-04 DIAGNOSIS — I10 PRIMARY HYPERTENSION: Primary | ICD-10-CM

## 2023-04-04 DIAGNOSIS — E29.1 HYPOGONADISM IN MALE: ICD-10-CM

## 2023-04-04 DIAGNOSIS — E11.9 TYPE 2 DIABETES MELLITUS WITHOUT COMPLICATION, WITHOUT LONG-TERM CURRENT USE OF INSULIN: ICD-10-CM

## 2023-04-04 LAB
APPEARANCE UR: CLEAR
BACTERIA #/AREA URNS HPF: NORMAL /[HPF]
BILIRUB UR QL STRIP: NEGATIVE
COLOR UR: YELLOW
CRYSTALS URNS MICRO: NORMAL
EPI CELLS #/AREA URNS HPF: NORMAL /HPF (ref 0–10)
GLUCOSE UR QL STRIP: NEGATIVE
HGB UR QL STRIP: NEGATIVE
KETONES UR QL STRIP: NEGATIVE
LEUKOCYTE ESTERASE UR QL STRIP: NEGATIVE
MICRO URNS: NORMAL
MICRO URNS: NORMAL
NITRITE UR QL STRIP: NEGATIVE
PH UR STRIP: 6.5 [PH] (ref 5–7.5)
PROT UR QL STRIP: NEGATIVE
RBC #/AREA URNS HPF: NORMAL /HPF (ref 0–2)
SP GR UR STRIP: 1.01 (ref 1–1.03)
URINALYSIS REFLEX: NORMAL
UROBILINOGEN UR STRIP-MCNC: 0.2 MG/DL (ref 0.2–1)
WBC #/AREA URNS HPF: NORMAL /HPF (ref 0–5)

## 2023-04-04 PROCEDURE — 99214 PR OFFICE/OUTPT VISIT, EST, LEVL IV, 30-39 MIN: ICD-10-PCS | Mod: ,,, | Performed by: INTERNAL MEDICINE

## 2023-04-04 PROCEDURE — 36415 PR COLLECTION VENOUS BLOOD,VENIPUNCTURE: ICD-10-PCS | Mod: ,,, | Performed by: INTERNAL MEDICINE

## 2023-04-04 PROCEDURE — 3078F PR MOST RECENT DIASTOLIC BLOOD PRESSURE < 80 MM HG: ICD-10-PCS | Mod: CPTII,,, | Performed by: INTERNAL MEDICINE

## 2023-04-04 PROCEDURE — 36415 COLL VENOUS BLD VENIPUNCTURE: CPT | Mod: ,,, | Performed by: INTERNAL MEDICINE

## 2023-04-04 PROCEDURE — 1160F PR REVIEW ALL MEDS BY PRESCRIBER/CLIN PHARMACIST DOCUMENTED: ICD-10-PCS | Mod: CPTII,,, | Performed by: INTERNAL MEDICINE

## 2023-04-04 PROCEDURE — 3008F BODY MASS INDEX DOCD: CPT | Mod: CPTII,,, | Performed by: INTERNAL MEDICINE

## 2023-04-04 PROCEDURE — 1160F RVW MEDS BY RX/DR IN RCRD: CPT | Mod: CPTII,,, | Performed by: INTERNAL MEDICINE

## 2023-04-04 PROCEDURE — 3077F SYST BP >= 140 MM HG: CPT | Mod: CPTII,,, | Performed by: INTERNAL MEDICINE

## 2023-04-04 PROCEDURE — 99214 OFFICE O/P EST MOD 30 MIN: CPT | Mod: ,,, | Performed by: INTERNAL MEDICINE

## 2023-04-04 PROCEDURE — 3008F PR BODY MASS INDEX (BMI) DOCUMENTED: ICD-10-PCS | Mod: CPTII,,, | Performed by: INTERNAL MEDICINE

## 2023-04-04 PROCEDURE — 1159F MED LIST DOCD IN RCRD: CPT | Mod: CPTII,,, | Performed by: INTERNAL MEDICINE

## 2023-04-04 PROCEDURE — 1159F PR MEDICATION LIST DOCUMENTED IN MEDICAL RECORD: ICD-10-PCS | Mod: CPTII,,, | Performed by: INTERNAL MEDICINE

## 2023-04-04 PROCEDURE — 3077F PR MOST RECENT SYSTOLIC BLOOD PRESSURE >= 140 MM HG: ICD-10-PCS | Mod: CPTII,,, | Performed by: INTERNAL MEDICINE

## 2023-04-04 PROCEDURE — 3078F DIAST BP <80 MM HG: CPT | Mod: CPTII,,, | Performed by: INTERNAL MEDICINE

## 2023-04-04 NOTE — TELEPHONE ENCOUNTER
----- Message from Titi Jason sent at 4/4/2023 11:28 AM CDT -----  Regarding: call back  .Type:  Needs Medical Advice    Who Called: dayanara  Would the patient rather a call back or a response via [employer]? cb  Best Call Back Number:  Additional Information: pt stated he was told to call back with his bp 127/71.

## 2023-04-05 LAB
ALBUMIN SERPL-MCNC: 4.6 G/DL (ref 3.8–4.8)
ALBUMIN/GLOB SERPL: 1.8 {RATIO} (ref 1.2–2.2)
ALP SERPL-CCNC: 62 IU/L (ref 44–121)
ALT SERPL-CCNC: 21 IU/L (ref 0–44)
AST SERPL-CCNC: 18 IU/L (ref 0–40)
BASOPHILS # BLD AUTO: 0.1 X10E3/UL (ref 0–0.2)
BASOPHILS NFR BLD AUTO: 1 %
BILIRUB SERPL-MCNC: 0.5 MG/DL (ref 0–1.2)
BUN SERPL-MCNC: 15 MG/DL (ref 8–27)
BUN/CREAT SERPL: 15 (ref 10–24)
CALCIUM SERPL-MCNC: 9.9 MG/DL (ref 8.6–10.2)
CHLORIDE SERPL-SCNC: 104 MMOL/L (ref 96–106)
CO2 SERPL-SCNC: 26 MMOL/L (ref 20–29)
CREAT SERPL-MCNC: 1.02 MG/DL (ref 0.76–1.27)
EOSINOPHIL # BLD AUTO: 0.1 X10E3/UL (ref 0–0.4)
EOSINOPHIL NFR BLD AUTO: 2 %
ERYTHROCYTE [DISTWIDTH] IN BLOOD BY AUTOMATED COUNT: 13.2 % (ref 11.6–15.4)
EST. GFR  (NO RACE VARIABLE): 82 ML/MIN/1.73
ESTRADIOL SERPL-MCNC: 28.1 PG/ML (ref 7.6–42.6)
GLOBULIN SER CALC-MCNC: 2.5 G/DL (ref 1.5–4.5)
GLUCOSE SERPL-MCNC: 112 MG/DL (ref 70–99)
HBA1C MFR BLD: 6.2 % (ref 4.8–5.6)
HCT VFR BLD AUTO: 47.7 % (ref 37.5–51)
HGB BLD-MCNC: 15.4 G/DL (ref 13–17.7)
IMM GRANULOCYTES NFR BLD AUTO: 0 %
LYMPHOCYTES # BLD AUTO: 2.4 X10E3/UL (ref 0.7–3.1)
LYMPHOCYTES NFR BLD AUTO: 35 %
MCH RBC QN AUTO: 28.5 PG (ref 26.6–33)
MCHC RBC AUTO-ENTMCNC: 32.3 G/DL (ref 31.5–35.7)
MCV RBC AUTO: 88 FL (ref 79–97)
MONOCYTES # BLD AUTO: 0.5 X10E3/UL (ref 0.1–0.9)
MONOCYTES NFR BLD AUTO: 7 %
NEUTROPHILS # BLD AUTO: 3.7 X10E3/UL (ref 1.4–7)
NEUTROPHILS NFR BLD AUTO: 55 %
PLATELET # BLD AUTO: 295 X10E3/UL (ref 150–450)
POTASSIUM SERPL-SCNC: 4.7 MMOL/L (ref 3.5–5.2)
PROT SERPL-MCNC: 7.1 G/DL (ref 6–8.5)
PSA SERPL-MCNC: 4.1 NG/ML (ref 0–4)
RBC # BLD AUTO: 5.41 X10E6/UL (ref 4.14–5.8)
SODIUM SERPL-SCNC: 142 MMOL/L (ref 134–144)
TESTOST SERPL-MCNC: 695 NG/DL (ref 264–916)
WBC # BLD AUTO: 6.8 X10E3/UL (ref 3.4–10.8)

## 2023-04-18 ENCOUNTER — TELEPHONE (OUTPATIENT)
Dept: PRIMARY CARE CLINIC | Facility: CLINIC | Age: 63
End: 2023-04-18
Payer: COMMERCIAL

## 2023-04-18 PROBLEM — R97.20 ELEVATED PSA: Status: ACTIVE | Noted: 2023-04-18

## 2023-04-18 NOTE — TELEPHONE ENCOUNTER
Patient came into office and wanted me to inform you that he has an MRI scheduled for today at 2 pm on his prostate because his levels were high. He also wanted me to inform you that Thursday at 4 pm he is going see his cardiologist.

## 2023-05-15 ENCOUNTER — PATIENT MESSAGE (OUTPATIENT)
Dept: ADMINISTRATIVE | Facility: HOSPITAL | Age: 63
End: 2023-05-15
Payer: COMMERCIAL

## 2023-05-24 ENCOUNTER — PATIENT OUTREACH (OUTPATIENT)
Dept: ADMINISTRATIVE | Facility: HOSPITAL | Age: 63
End: 2023-05-24
Payer: COMMERCIAL

## 2023-08-22 LAB
LEFT EYE DM RETINOPATHY: NEGATIVE
RIGHT EYE DM RETINOPATHY: NEGATIVE

## 2023-08-31 ENCOUNTER — TELEPHONE (OUTPATIENT)
Dept: PRIMARY CARE CLINIC | Facility: CLINIC | Age: 63
End: 2023-08-31
Payer: COMMERCIAL

## 2023-08-31 DIAGNOSIS — G47.33 OBSTRUCTIVE SLEEP APNEA ON CPAP: Primary | ICD-10-CM

## 2023-08-31 NOTE — TELEPHONE ENCOUNTER
Spoke to patient he is requesting to be tested again for a new C pap machine last tested was 2013 at [facility].I call them they have no records just the compliance note. He can be tested at home or at their sleep lab.    [phone]

## 2023-08-31 NOTE — TELEPHONE ENCOUNTER
----- Message from Mishel Marysolsiomara sent at 8/31/2023  9:08 AM CDT -----  Regarding: advice  Type:  Needs Medical Advice    Who Called: pt  Symptoms (please be specific):   How long has patient had these symptoms:    Pharmacy name and phone #:    Would the patient rather a call back or a response via [facility]?   Best Call Back Number: [phone]  Additional Information: pt called about his cpap breaking and is needing a new one. He stated that he also needs a sleep study done and requested to speak to the nurse about it. Please advise

## 2023-09-05 ENCOUNTER — OFFICE VISIT (OUTPATIENT)
Dept: NEUROLOGY | Facility: CLINIC | Age: 63
End: 2023-09-05
Payer: COMMERCIAL

## 2023-09-05 VITALS
BODY MASS INDEX: 33.75 KG/M2 | HEIGHT: 66 IN | SYSTOLIC BLOOD PRESSURE: 160 MMHG | WEIGHT: 210 LBS | DIASTOLIC BLOOD PRESSURE: 82 MMHG

## 2023-09-05 DIAGNOSIS — G47.33 OBSTRUCTIVE SLEEP APNEA SYNDROME: Primary | ICD-10-CM

## 2023-09-05 PROCEDURE — 3079F DIAST BP 80-89 MM HG: CPT | Mod: CPTII,S$GLB,, | Performed by: NURSE PRACTITIONER

## 2023-09-05 PROCEDURE — 99203 PR OFFICE/OUTPT VISIT, NEW, LEVL III, 30-44 MIN: ICD-10-PCS | Mod: S$GLB,,, | Performed by: NURSE PRACTITIONER

## 2023-09-05 PROCEDURE — 3008F PR BODY MASS INDEX (BMI) DOCUMENTED: ICD-10-PCS | Mod: CPTII,S$GLB,, | Performed by: NURSE PRACTITIONER

## 2023-09-05 PROCEDURE — 99999 PR PBB SHADOW E&M-EST. PATIENT-LVL IV: ICD-10-PCS | Mod: PBBFAC,,, | Performed by: NURSE PRACTITIONER

## 2023-09-05 PROCEDURE — 1159F MED LIST DOCD IN RCRD: CPT | Mod: CPTII,S$GLB,, | Performed by: NURSE PRACTITIONER

## 2023-09-05 PROCEDURE — 3008F BODY MASS INDEX DOCD: CPT | Mod: CPTII,S$GLB,, | Performed by: NURSE PRACTITIONER

## 2023-09-05 PROCEDURE — 3077F SYST BP >= 140 MM HG: CPT | Mod: CPTII,S$GLB,, | Performed by: NURSE PRACTITIONER

## 2023-09-05 PROCEDURE — 3044F HG A1C LEVEL LT 7.0%: CPT | Mod: CPTII,S$GLB,, | Performed by: NURSE PRACTITIONER

## 2023-09-05 PROCEDURE — 3079F PR MOST RECENT DIASTOLIC BLOOD PRESSURE 80-89 MM HG: ICD-10-PCS | Mod: CPTII,S$GLB,, | Performed by: NURSE PRACTITIONER

## 2023-09-05 PROCEDURE — 3077F PR MOST RECENT SYSTOLIC BLOOD PRESSURE >= 140 MM HG: ICD-10-PCS | Mod: CPTII,S$GLB,, | Performed by: NURSE PRACTITIONER

## 2023-09-05 PROCEDURE — 99203 OFFICE O/P NEW LOW 30 MIN: CPT | Mod: S$GLB,,, | Performed by: NURSE PRACTITIONER

## 2023-09-05 PROCEDURE — 3044F PR MOST RECENT HEMOGLOBIN A1C LEVEL <7.0%: ICD-10-PCS | Mod: CPTII,S$GLB,, | Performed by: NURSE PRACTITIONER

## 2023-09-05 PROCEDURE — 99999 PR PBB SHADOW E&M-EST. PATIENT-LVL IV: CPT | Mod: PBBFAC,,, | Performed by: NURSE PRACTITIONER

## 2023-09-05 PROCEDURE — 1159F PR MEDICATION LIST DOCUMENTED IN MEDICAL RECORD: ICD-10-PCS | Mod: CPTII,S$GLB,, | Performed by: NURSE PRACTITIONER

## 2023-09-05 NOTE — PROGRESS NOTES
New Patient   LAWSON Evaluation      SUBJECTIVE:  Patient ID: Jason Choi   62 y.o.   Referred By: Dr. Thu Soriano    Past Medical History:   Diagnosis Date    BPH (benign prostatic hyperplasia)     Calcaneal spur of foot     Decreased testosterone level     Diverticulosis     Lipoma of forearm     Male erectile dysfunction, unspecified     LAWSON on CPAP     Tubular adenoma of colon     Type 2 diabetes mellitus without complications      Past Surgical History:   Procedure Laterality Date    COLON BIOPSY  10/04/2021    HIP REPLACEMENT ARTHROPLASTY Right 12/20/2022    LIPOMA RESECTION       Family History   Problem Relation Age of Onset    Diabetes Mother     COPD Mother     Hypertension Father     Prostate cancer Father     Kidney disease Father     Diabetes Father     Osteoarthritis Father     Throat cancer Sister     Hypertension Sister     Diabetes Sister     Hypertension Brother     Atrial fibrillation Brother        Review of patient's allergies indicates:  No Known Allergies    Chief Complaint: NP ref by Dr Soriano for LAWSON management     History of Present Illness:     New patient referred by  for LAWSON management    Pt had SS done here in 2013 and was Dx with LAWSON. Pt has not seen a Dr for LAWSON since 2013. Wears CPAP qhs and is tolerating it well. Sleeps better with CPAP.     Sleeps 8 1/2 a night and awakens 1 time due to nocturia and is able to go right back to sleep.     Awakens refreshed and denies EDS. Does not nap.     Does not change mask and tubing on a regular basis. Pt states his CPAP broke last week and he needs a new one.   DME -Online      Review of Systems - as per HPI, otherwise a balanced 10 systems review is negative.      Current Medications:  Current Outpatient Medications   Medication Instructions    anastrozole (ARIMIDEX) 1 mg, Oral, Every 7 days    ascorbic acid/zinc (ZINC WITH VITAMIN C ORAL) Oral    aspirin 81 mg Cap aspirin Take No date recorded No form recorded No frequency  "recorded No route recorded No set duration recorded No set duration amount recorded active No dosage strength recorded No dosage strength units of measure recorded    azelastine (ASTELIN) 137 mcg (0.1 %) nasal spray Nasal    BD LUER-JACKELINE SYRINGE 3 mL 23 x 1" Syrg use to inject testosterone every 2 weeks    BD REGULAR BEVEL NEEDLES 18 gauge x 1" Ndle USE THE 18 GAUGE TO DRAW UP THE TESTOSTERONE EVERY 2 WEEKS    diphenhydramine HCl (UNISOM, DIPHENHYDRAMINE, ORAL) Oral, Nightly PRN    docosahexaenoic acid/epa (FISH OIL ORAL) 500 mg, Oral, Daily    fluticasone propionate (FLONASE) 50 mcg, Each Nostril, Daily    HYDROcodone-acetaminophen (NORCO) 5-325 mg per tablet 1 tablet, Oral, Every 4 hours PRN    ibuprofen (ADVIL,MOTRIN) 200 MG tablet Advil Take No date recorded No form recorded No frequency recorded No route recorded No set duration recorded No set duration amount recorded active No dosage strength recorded No dosage strength units of measure recorded    melatonin 10 mg Tab Oral    multivitamin with iron Tab   0 Refill(s)    mupirocin (BACTROBAN) 0.25 g, Topical (Top), 3 times daily, Apply to affected area    MYRBETRIQ 50 mg Tb24 No dose, route, or frequency recorded.    needle, disp, 18 G (BD REGULAR BEVEL NEEDLES) 18 gauge x 1 1/2" Ndle   BD Regular Bevel Needles 18 gauge x 1", See Instructions, USE THE 18 GAUGE TO DRAW UP THE TESTOSTERONE EVERY 2 WEEKS, # 25 EA, 4 Refill(s), Pharmacy: [facility], 172, cm, Height/Length Dosing, 05/05/21 8:26:00 CDT, 81.64, kg, Weight Dosing...    red yeast rice 1,200 mg, Oral    sildenafiL (VIAGRA) 100 mg, Oral, As needed (PRN)    solifenacin (VESICARE) 5 mg, Oral, Daily    tamsulosin (FLOMAX) 0.4 mg Cap TAKE ONE CAPSULE BY MOUTH DAILY    testosterone cypionate (DEPOTESTOTERONE CYPIONATE) 200 mg/mL injection Inject 150 mg into the muscle every 14 (fourteen) days.    TUMERIC-GING-OLIVE-OREG-CAPRYL ORAL Oral           OBJECTIVE:  Vitals:  BP (!) 160/82   Ht 5' 6" (1.676 " m)   Wt 95.3 kg (210 lb)   BMI 33.89 kg/m²      Neck Circumference: 18 in     Physical Exam   Constitutional: he appears well-developed and well-nourished.    Accompanied by - self  appearance - well appearing, no apparent distress, unassisted  oropharynx - Mallampati score class 3, scalloped tongue ok dentition  heart - regular rate and rhythm with murmur auscultated   lungs - CTA   skin- no obvious lesions noted    Neurologic Exam:  Cortical function - The patient is alert, attentive, and oriented; cooperative with exam, good eye contact  Speech - clear and fluent   cranial nerves:  CN 2 - visual fields are full to confrontation.   CN 3, 4, 6 EOMs - normal. No ptosis or lateral gaze deviation  CN 7 - no face asymmetry; normal eye closure and smile  CN 8 - hard of hearing    CN 9, 10, 11- palate elevates symmetrically. Shoulder shrug and head turn intact  CN 12 tongue - protrudes midline with normal movements and no atrophy  Motor - No pronator drift. Muscle bulk and tone normal. Arose from chair with arms folded. Able to walk on tip toes and heels. No lateralizing or focal deficits noted  Gait - unassisted, posture upright. gait is steady with normal steps, arm swing and turning. Tandem- off balance.  Coordination - finger to nose intact.      Review of Data:      Labs:  No visits with results within 3 Month(s) from this visit.   Latest known visit with results is:   Office Visit on 04/04/2023   Component Date Value Ref Range Status    WBC 04/04/2023 6.8  3.4 - 10.8 x10E3/uL Final    RBC 04/04/2023 5.41  4.14 - 5.80 x10E6/uL Final    Hemoglobin 04/04/2023 15.4  13.0 - 17.7 g/dL Final    Hematocrit 04/04/2023 47.7  37.5 - 51.0 % Final    MCV 04/04/2023 88  79 - 97 fL Final    MCH 04/04/2023 28.5  26.6 - 33.0 pg Final    MCHC 04/04/2023 32.3  31.5 - 35.7 g/dL Final    RDW 04/04/2023 13.2  11.6 - 15.4 % Final    Platelets 04/04/2023 295  150 - 450 x10E3/uL Final    Neutrophils 04/04/2023 55  Not Estab. % Final     Lymphs 04/04/2023 35  Not Estab. % Final    Monocytes 04/04/2023 7  Not Estab. % Final    Eos 04/04/2023 2  Not Estab. % Final    Basos 04/04/2023 1  Not Estab. % Final    Neutrophils (Absolute) 04/04/2023 3.7  1.4 - 7.0 x10E3/uL Final    Lymphs (Absolute) 04/04/2023 2.4  0.7 - 3.1 x10E3/uL Final    Monocytes(Absolute) 04/04/2023 0.5  0.1 - 0.9 x10E3/uL Final    Eos (Absolute) 04/04/2023 0.1  0.0 - 0.4 x10E3/uL Final    Baso (Absolute) 04/04/2023 0.1  0.0 - 0.2 x10E3/uL Final    Immature Granulocytes 04/04/2023 0  Not Estab. % Final    Glucose 04/04/2023 112 (H)  70 - 99 mg/dL Final    BUN 04/04/2023 15  8 - 27 mg/dL Final    Creatinine 04/04/2023 1.02  0.76 - 1.27 mg/dL Final    eGFR 04/04/2023 82  >59 mL/min/1.73 Final    BUN/Creatinine Ratio 04/04/2023 15  10 - 24 Final    Sodium 04/04/2023 142  134 - 144 mmol/L Final    Potassium 04/04/2023 4.7  3.5 - 5.2 mmol/L Final    Chloride 04/04/2023 104  96 - 106 mmol/L Final    CO2 04/04/2023 26  20 - 29 mmol/L Final    Calcium 04/04/2023 9.9  8.6 - 10.2 mg/dL Final    Protein, Total 04/04/2023 7.1  6.0 - 8.5 g/dL Final    Albumin 04/04/2023 4.6  3.8 - 4.8 g/dL Final    Globulin, Total 04/04/2023 2.5  1.5 - 4.5 g/dL Final    Albumin/Globulin Ratio 04/04/2023 1.8  1.2 - 2.2 Final    Total Bilirubin 04/04/2023 0.5  0.0 - 1.2 mg/dL Final    Alkaline Phosphatase 04/04/2023 62  44 - 121 IU/L Final    AST 04/04/2023 18  0 - 40 IU/L Final    ALT 04/04/2023 21  0 - 44 IU/L Final    Testosterone 04/04/2023 695  264 - 916 ng/dL Final    Hemoglobin A1c 04/04/2023 6.2 (H)  4.8 - 5.6 % Final    PSA - [facility] 04/04/2023 4.1 (H)  0.0 - 4.0 ng/mL Final    Specific Gravity, UA 04/04/2023 1.009  1.005 - 1.030 Final    pH, UA 04/04/2023 6.5  5.0 - 7.5 Final    Color, UA 04/04/2023 Yellow  Yellow Final    Clarity, UA 04/04/2023 Clear  Clear Final    Leukocytes, UA 04/04/2023 Negative  Negative Final    Protein, UA 04/04/2023 Negative  Negative/Trace Final    Glucose, UA 04/04/2023 Negative   Negative Final    Ketones, UA 04/04/2023 Negative  Negative Final    Occult Blood UA 04/04/2023 Negative  Negative Final    Bilirubin, UA 04/04/2023 Negative  Negative Final    Urobilinogen, UA 04/04/2023 0.2  0.2 - 1.0 mg/dL Final    Nitrite, UA 04/04/2023 Negative  Negative Final    Microscopic Examination 04/04/2023 Comment   Final    Microscopic Examination 04/04/2023 See below:   Final    Urinalysis Reflex 04/04/2023 Comment   Final    WBC, UA 04/04/2023 None seen  0 - 5 /hpf Final    RBC, UA 04/04/2023 None seen  0 - 2 /hpf Final    Epithelial Cells (non renal) 04/04/2023 None seen  0 - 10 /hpf Final    Crystal Type 04/04/2023 None seen  N/A Final    Bacteria, UA 04/04/2023 None seen  None seen/Few Final    Estradiol 04/04/2023 28.1  7.6 - 42.6 pg/mL Final         PLAN:  Problem List Items Addressed This Visit          Other    Obstructive sleep apnea syndrome - Primary     Reviewed HST (from 2013; see detailed results and interpretation scanned into chart). Suggest autoPAP with pressures from 5-20 cm.    Encouraged nightly use of PAP. Discussed minimum insurance guidelines for PAP use    Reminded pt that drowsy driving may still occur despite PAP use.    Follow up requested in 3 mo. Instructed pt to call prior if needed                Items discussed include acute and/or chronic neurological, sleep, or other issues and their attendant differential diagnoses.  Potential for additional testing, treatment options, and prognosis also discussed.  Questions and concerns were sought and answered to the patient's stated verbal satisfaction.    The patient verbalizes understanding and agreement with the above stated treatment plan.     Items discussed include acute and/or chronic neurological, sleep, or other issues and their attendant differential diagnoses.  Potential for additional testing, treatment options, and prognosis also discussed.    __*_single dx ___multiple issues/ diagnoses  ___ low *__mod ___ high  complexity of data  _*__low __mod ___ high risks     Medical Decision Making (MDM) used for CPT choice:  _*__low  ___moderate  ____high           MOHSEN Garrido  [facility]  [phone]

## 2023-09-13 ENCOUNTER — TELEPHONE (OUTPATIENT)
Dept: PRIMARY CARE CLINIC | Facility: CLINIC | Age: 63
End: 2023-09-13
Payer: COMMERCIAL

## 2023-09-13 DIAGNOSIS — Z00.00 WELLNESS EXAMINATION: Primary | ICD-10-CM

## 2023-09-13 DIAGNOSIS — R79.89 LOW TESTOSTERONE IN MALE: ICD-10-CM

## 2023-09-13 DIAGNOSIS — I10 HYPERTENSION, UNSPECIFIED TYPE: ICD-10-CM

## 2023-09-13 DIAGNOSIS — G47.33 OBSTRUCTIVE SLEEP APNEA: ICD-10-CM

## 2023-09-13 DIAGNOSIS — E11.9 WELL CONTROLLED TYPE 2 DIABETES MELLITUS: ICD-10-CM

## 2023-09-13 NOTE — TELEPHONE ENCOUNTER
Pt is coming for wellness Oct 17. Can orders be placed? He will go to [facility] for labs

## 2023-09-25 ENCOUNTER — TELEPHONE (OUTPATIENT)
Dept: NEUROLOGY | Facility: CLINIC | Age: 63
End: 2023-09-25
Payer: COMMERCIAL

## 2023-09-25 NOTE — TELEPHONE ENCOUNTER
----- Message from Kassandra Brock RRT sent at 9/22/2023  3:22 PM CDT -----  Regarding: setup notes for autopap  Patient was setup with: airsense 10 autoset ,slim tubing    Patient was fitted with: F20 medium    Patient was instructed on the proper use and operation of equipment.  Patient verbalized understanding of instructions given.  We will follow as needed.

## 2023-10-10 ENCOUNTER — TELEPHONE (OUTPATIENT)
Dept: PRIMARY CARE CLINIC | Facility: CLINIC | Age: 63
End: 2023-10-10
Payer: COMMERCIAL

## 2023-10-10 NOTE — TELEPHONE ENCOUNTER
1. Are there any outstanding tasks in patient's chart? (Ex. Labs, MMG)? LABS THURSDAY    2. Do we have any outstanding/Pending referrals?-    3. Has the patient been seen in an ER, Urgent Care or been admitted to the hospital since last office visit with our office?-    4. Has the patient seen any other health care provider (doctors) since last visit?-    5. Has the patient had any blood work or x-rays done since last visit?-    FOR CLINICAL USE ONLY (DO NOT ASK PATIENT)    6. Is the patients pneumonia vaccine current?  Prevnar 13:-  Pneumonia 20:-  Pneumovax 23:-    7. When was the patient's Colonoscopy?10/4/21    8. When was the patient's last Mammogram?6/24/21    9.When was the patients last cervical screening/PAP smear?-    10. When was the patient's last bone scan?-    11. When was the patient's last diabetic screening?  A1C:4/4/23  Mirco:5/10/22  Eye Exam: 5/12/21

## 2023-10-12 ENCOUNTER — LAB VISIT (OUTPATIENT)
Dept: LAB | Facility: HOSPITAL | Age: 63
End: 2023-10-12
Attending: INTERNAL MEDICINE
Payer: COMMERCIAL

## 2023-10-12 DIAGNOSIS — E11.9 WELL CONTROLLED TYPE 2 DIABETES MELLITUS: ICD-10-CM

## 2023-10-12 DIAGNOSIS — I10 HYPERTENSION, UNSPECIFIED TYPE: ICD-10-CM

## 2023-10-12 DIAGNOSIS — Z00.00 WELLNESS EXAMINATION: ICD-10-CM

## 2023-10-12 DIAGNOSIS — R79.89 LOW TESTOSTERONE IN MALE: ICD-10-CM

## 2023-10-12 DIAGNOSIS — G47.33 OBSTRUCTIVE SLEEP APNEA: ICD-10-CM

## 2023-10-12 LAB
ALBUMIN SERPL-MCNC: 3.7 G/DL (ref 3.4–4.8)
ALBUMIN/GLOB SERPL: 1.1 RATIO (ref 1.1–2)
ALP SERPL-CCNC: 48 UNIT/L (ref 40–150)
ALT SERPL-CCNC: 26 UNIT/L (ref 0–55)
APPEARANCE UR: CLEAR
AST SERPL-CCNC: 17 UNIT/L (ref 5–34)
BACTERIA #/AREA URNS AUTO: NORMAL /HPF
BASOPHILS # BLD AUTO: 0.04 X10(3)/MCL
BASOPHILS NFR BLD AUTO: 0.6 %
BILIRUB SERPL-MCNC: 0.5 MG/DL
BILIRUB UR QL STRIP.AUTO: NEGATIVE
BUN SERPL-MCNC: 12.7 MG/DL (ref 8.4–25.7)
CALCIUM SERPL-MCNC: 9.3 MG/DL (ref 8.8–10)
CHLORIDE SERPL-SCNC: 105 MMOL/L (ref 98–107)
CHOLEST SERPL-MCNC: 193 MG/DL
CHOLEST/HDLC SERPL: 4 {RATIO} (ref 0–5)
CO2 SERPL-SCNC: 28 MMOL/L (ref 23–31)
COLOR UR AUTO: YELLOW
CREAT SERPL-MCNC: 0.95 MG/DL (ref 0.73–1.18)
CREAT UR-MCNC: 53.9 MG/DL (ref 63–166)
EOSINOPHIL # BLD AUTO: 0.1 X10(3)/MCL (ref 0–0.9)
EOSINOPHIL NFR BLD AUTO: 1.6 %
ERYTHROCYTE [DISTWIDTH] IN BLOOD BY AUTOMATED COUNT: 12.7 % (ref 11.5–17)
EST. AVERAGE GLUCOSE BLD GHB EST-MCNC: 119.8 MG/DL
GFR SERPLBLD CREATININE-BSD FMLA CKD-EPI: >60 MLS/MIN/1.73/M2
GLOBULIN SER-MCNC: 3.4 GM/DL (ref 2.4–3.5)
GLUCOSE SERPL-MCNC: 120 MG/DL (ref 82–115)
GLUCOSE UR QL STRIP.AUTO: NEGATIVE
HBA1C MFR BLD: 5.8 %
HCT VFR BLD AUTO: 44.5 % (ref 42–52)
HDLC SERPL-MCNC: 51 MG/DL (ref 35–60)
HGB BLD-MCNC: 13.8 G/DL (ref 14–18)
IMM GRANULOCYTES # BLD AUTO: 0.01 X10(3)/MCL (ref 0–0.04)
IMM GRANULOCYTES NFR BLD AUTO: 0.2 %
KETONES UR QL STRIP.AUTO: NEGATIVE
LDLC SERPL CALC-MCNC: 125 MG/DL (ref 50–140)
LEUKOCYTE ESTERASE UR QL STRIP.AUTO: NEGATIVE
LYMPHOCYTES # BLD AUTO: 2.04 X10(3)/MCL (ref 0.6–4.6)
LYMPHOCYTES NFR BLD AUTO: 32.4 %
MCH RBC QN AUTO: 28.6 PG (ref 27–31)
MCHC RBC AUTO-ENTMCNC: 31 G/DL (ref 33–36)
MCV RBC AUTO: 92.3 FL (ref 80–94)
MICROALBUMIN UR-MCNC: <5 UG/ML
MICROALBUMIN/CREAT RATIO PNL UR: ABNORMAL
MONOCYTES # BLD AUTO: 0.43 X10(3)/MCL (ref 0.1–1.3)
MONOCYTES NFR BLD AUTO: 6.8 %
NEUTROPHILS # BLD AUTO: 3.67 X10(3)/MCL (ref 2.1–9.2)
NEUTROPHILS NFR BLD AUTO: 58.4 %
NITRITE UR QL STRIP.AUTO: NEGATIVE
PH UR STRIP.AUTO: 7 [PH]
PLATELET # BLD AUTO: 316 X10(3)/MCL (ref 130–400)
PMV BLD AUTO: 9.7 FL (ref 7.4–10.4)
POTASSIUM SERPL-SCNC: 4.3 MMOL/L (ref 3.5–5.1)
PROT SERPL-MCNC: 7.1 GM/DL (ref 5.8–7.6)
PROT UR QL STRIP.AUTO: NEGATIVE
RBC # BLD AUTO: 4.82 X10(6)/MCL (ref 4.7–6.1)
RBC #/AREA URNS AUTO: NORMAL /HPF
RBC UR QL AUTO: NEGATIVE
SODIUM SERPL-SCNC: 141 MMOL/L (ref 136–145)
SP GR UR STRIP.AUTO: 1.02 (ref 1–1.03)
SQUAMOUS #/AREA URNS AUTO: NORMAL /HPF
TESTOST SERPL-MCNC: 304.6 NG/DL (ref 220.91–715.81)
TRIGL SERPL-MCNC: 87 MG/DL (ref 34–140)
TSH SERPL-ACNC: 0.78 UIU/ML (ref 0.35–4.94)
UROBILINOGEN UR STRIP-ACNC: 0.2
VLDLC SERPL CALC-MCNC: 17 MG/DL
WBC # SPEC AUTO: 6.29 X10(3)/MCL (ref 4.5–11.5)
WBC #/AREA URNS AUTO: NORMAL /HPF

## 2023-10-12 PROCEDURE — 84403 ASSAY OF TOTAL TESTOSTERONE: CPT

## 2023-10-12 PROCEDURE — 36415 COLL VENOUS BLD VENIPUNCTURE: CPT

## 2023-10-12 PROCEDURE — 80061 LIPID PANEL: CPT

## 2023-10-12 PROCEDURE — 85025 COMPLETE CBC W/AUTO DIFF WBC: CPT

## 2023-10-12 PROCEDURE — 82043 UR ALBUMIN QUANTITATIVE: CPT

## 2023-10-12 PROCEDURE — 81001 URINALYSIS AUTO W/SCOPE: CPT

## 2023-10-12 PROCEDURE — 80053 COMPREHEN METABOLIC PANEL: CPT

## 2023-10-12 PROCEDURE — 83036 HEMOGLOBIN GLYCOSYLATED A1C: CPT

## 2023-10-12 PROCEDURE — 84443 ASSAY THYROID STIM HORMONE: CPT

## 2023-10-16 NOTE — PROGRESS NOTES
Patient ID: [id number]     Chief Complaint: No chief complaint on file.      HPI:     Jason Choi is a 63 y.o. male here today for a Medicare Wellness.       Opioid Screening: Patient medication list reviewed, patient {IS / IS NOT:67701} taking prescription opioids. Patient {IS / IS NOT:45841} using additional opioids than prescribed. Patient {IS / IS NOT:49348} at low risk of substance abuse based on this opioid use history.       Past Medical History:   Diagnosis Date    BPH (benign prostatic hyperplasia)     Calcaneal spur of foot     Decreased testosterone level     Diverticulosis     Lipoma of forearm     Male erectile dysfunction, unspecified     LAWSON on CPAP     Tubular adenoma of colon     Type 2 diabetes mellitus without complications         Past Surgical History:   Procedure Laterality Date    COLON BIOPSY  10/04/2021    HIP REPLACEMENT ARTHROPLASTY Right 12/20/2022    LIPOMA RESECTION         Review of patient's allergies indicates:  No Known Allergies    No outpatient medications have been marked as taking for the 10/17/23 encounter (Appointment) with Thu Soriano MD.       Social History     Socioeconomic History    Marital status:    Tobacco Use    Smoking status: Never    Smokeless tobacco: Never   Substance and Sexual Activity    Alcohol use: Yes     Alcohol/week: 2.0 standard drinks of alcohol     Types: 2 Cans of beer per week    Drug use: Never        Family History   Problem Relation Age of Onset    Diabetes Mother     COPD Mother     Hypertension Father     Prostate cancer Father     Kidney disease Father     Diabetes Father     Osteoarthritis Father     Throat cancer Sister     Hypertension Sister     Diabetes Sister     Hypertension Brother     Atrial fibrillation Brother         Patient Care Team:  Thu Soriano MD as PCP - General (Internal Medicine)  Cali Salmon MD as Consulting Physician (Urology)  Jordan, Federico PORTER MD as Consulting Physician  (Gastroenterology)  Roque Bojorquez MD as Consulting Physician (Otolaryngology)  Lizzie Corea MD as Consulting Physician (Cardiology)  Jose Raul Nolen as Consulting Physician (Dermatology)  Joseph De La Vega MD as Consulting Physician (Gastroenterology)  KALLIE Cochran MD (Orthopedic Surgery)  Itzel Figueroa LPN as Licensed Practical Nurse       Subjective:     ROS      Patient Reported Health Risk Assessment       Objective:     There were no vitals taken for this visit.    Physical Exam           No data to display                  4/4/2023     8:20 AM 10/4/2022     8:20 AM 5/10/2022     8:20 AM   Fall Risk Assessment - Outpatient   Mobility Status Ambulatory Ambulatory Ambulatory   Number of falls 0 0 0   Identified as fall risk False False False              Assessment/Plan:     1. Wellness examination    2. Type 2 diabetes mellitus without complication, without long-term current use of insulin    3. Primary hypertension    4. Benign prostatic hyperplasia, unspecified whether lower urinary tract symptoms present    5. Elevated PSA    6. Decreased testosterone level    7. Adenomatous polyp of colon, unspecified part of colon           Medicare Annual Wellness and Personalized Prevention Plan:   Fall Risk + Home Safety + Hearing Impairment + Depression Screen + Opioid and Substance Abuse Screening + Cognitive Impairment Screen + Health Risk Assessment all reviewed.     Health Maintenance Topics with due status: Not Due       Topic Last Completion Date    TETANUS VACCINE 10/09/2018    Colorectal Cancer Screening 10/04/2021    PROSTATE-SPECIFIC ANTIGEN 04/04/2023    Diabetes Urine Screening 10/12/2023    Lipid Panel 10/12/2023    Hemoglobin A1c 10/12/2023      The patient's Health Maintenance was reviewed and the following appears to be due at this time:   Health Maintenance Due   Topic Date Due    Hepatitis C Screening  Never done    Pneumococcal Vaccines (Age 65+) (1 - PCV) Never done    HIV Screening  Never  done    Low Dose Statin  Never done    Shingles Vaccine (1 of 2) Never done    Eye Exam  05/12/2022    Mammogram  01/30/2023    Foot Exam  05/10/2023    Influenza Vaccine (1) 09/01/2023    COVID-19 Vaccine (3 - 2023-24 season) 09/01/2023       Advance Care Planning   I attest to discussing Advance Care Planning with patient and/or family member.  Education was provided including the importance of the Health Care Power of , Advance Directives, and/or [place] documentation.  The patient expressed understanding to the importance of this information and discussion.         No follow-ups on file. In addition to their scheduled follow up, the patient has also been instructed to follow up on as needed basis.

## 2023-10-17 ENCOUNTER — OFFICE VISIT (OUTPATIENT)
Dept: PRIMARY CARE CLINIC | Facility: CLINIC | Age: 63
End: 2023-10-17
Payer: COMMERCIAL

## 2023-10-17 VITALS
TEMPERATURE: 99 F | WEIGHT: 208 LBS | SYSTOLIC BLOOD PRESSURE: 135 MMHG | HEART RATE: 66 BPM | DIASTOLIC BLOOD PRESSURE: 81 MMHG | RESPIRATION RATE: 15 BRPM | OXYGEN SATURATION: 97 % | BODY MASS INDEX: 33.43 KG/M2 | HEIGHT: 66 IN

## 2023-10-17 DIAGNOSIS — R79.89 DECREASED TESTOSTERONE LEVEL: ICD-10-CM

## 2023-10-17 DIAGNOSIS — R97.20 ELEVATED PSA: ICD-10-CM

## 2023-10-17 DIAGNOSIS — Z79.890 ENCOUNTER FOR MONITORING TESTOSTERONE REPLACEMENT THERAPY: ICD-10-CM

## 2023-10-17 DIAGNOSIS — D12.6 ADENOMATOUS POLYP OF COLON, UNSPECIFIED PART OF COLON: ICD-10-CM

## 2023-10-17 DIAGNOSIS — Z00.00 WELLNESS EXAMINATION: Primary | ICD-10-CM

## 2023-10-17 DIAGNOSIS — N40.0 BENIGN PROSTATIC HYPERPLASIA, UNSPECIFIED WHETHER LOWER URINARY TRACT SYMPTOMS PRESENT: ICD-10-CM

## 2023-10-17 DIAGNOSIS — I10 PRIMARY HYPERTENSION: ICD-10-CM

## 2023-10-17 DIAGNOSIS — E11.9 TYPE 2 DIABETES MELLITUS WITHOUT COMPLICATION, WITHOUT LONG-TERM CURRENT USE OF INSULIN: ICD-10-CM

## 2023-10-17 DIAGNOSIS — Z51.81 ENCOUNTER FOR MONITORING TESTOSTERONE REPLACEMENT THERAPY: ICD-10-CM

## 2023-10-17 PROCEDURE — 99397 PER PM REEVAL EST PAT 65+ YR: CPT | Mod: 25,,, | Performed by: INTERNAL MEDICINE

## 2023-10-17 PROCEDURE — 1159F MED LIST DOCD IN RCRD: CPT | Mod: CPTII,,, | Performed by: INTERNAL MEDICINE

## 2023-10-17 PROCEDURE — 3288F FALL RISK ASSESSMENT DOCD: CPT | Mod: CPTII,,, | Performed by: INTERNAL MEDICINE

## 2023-10-17 PROCEDURE — 90694 FLU VACCINE - QUADRIVALENT - ADJUVANTED: ICD-10-PCS | Mod: ,,, | Performed by: INTERNAL MEDICINE

## 2023-10-17 PROCEDURE — 3079F DIAST BP 80-89 MM HG: CPT | Mod: CPTII,,, | Performed by: INTERNAL MEDICINE

## 2023-10-17 PROCEDURE — 3044F HG A1C LEVEL LT 7.0%: CPT | Mod: CPTII,,, | Performed by: INTERNAL MEDICINE

## 2023-10-17 PROCEDURE — 1160F RVW MEDS BY RX/DR IN RCRD: CPT | Mod: CPTII,,, | Performed by: INTERNAL MEDICINE

## 2023-10-17 PROCEDURE — 1101F PR PT FALLS ASSESS DOC 0-1 FALLS W/OUT INJ PAST YR: ICD-10-PCS | Mod: CPTII,,, | Performed by: INTERNAL MEDICINE

## 2023-10-17 PROCEDURE — 3288F PR FALLS RISK ASSESSMENT DOCUMENTED: ICD-10-PCS | Mod: CPTII,,, | Performed by: INTERNAL MEDICINE

## 2023-10-17 PROCEDURE — 3066F PR DOCUMENTATION OF TREATMENT FOR NEPHROPATHY: ICD-10-PCS | Mod: CPTII,,, | Performed by: INTERNAL MEDICINE

## 2023-10-17 PROCEDURE — 99397 PR PREVENTIVE VISIT,EST,65 & OVER: ICD-10-PCS | Mod: 25,,, | Performed by: INTERNAL MEDICINE

## 2023-10-17 PROCEDURE — 3061F PR NEG MICROALBUMINURIA RESULT DOCUMENTED/REVIEW: ICD-10-PCS | Mod: CPTII,,, | Performed by: INTERNAL MEDICINE

## 2023-10-17 PROCEDURE — 3075F SYST BP GE 130 - 139MM HG: CPT | Mod: CPTII,,, | Performed by: INTERNAL MEDICINE

## 2023-10-17 PROCEDURE — 1160F PR REVIEW ALL MEDS BY PRESCRIBER/CLIN PHARMACIST DOCUMENTED: ICD-10-PCS | Mod: CPTII,,, | Performed by: INTERNAL MEDICINE

## 2023-10-17 PROCEDURE — 1159F PR MEDICATION LIST DOCUMENTED IN MEDICAL RECORD: ICD-10-PCS | Mod: CPTII,,, | Performed by: INTERNAL MEDICINE

## 2023-10-17 PROCEDURE — 1101F PT FALLS ASSESS-DOCD LE1/YR: CPT | Mod: CPTII,,, | Performed by: INTERNAL MEDICINE

## 2023-10-17 PROCEDURE — 90471 FLU VACCINE - QUADRIVALENT - ADJUVANTED: ICD-10-PCS | Mod: ,,, | Performed by: INTERNAL MEDICINE

## 2023-10-17 PROCEDURE — 3008F BODY MASS INDEX DOCD: CPT | Mod: CPTII,,, | Performed by: INTERNAL MEDICINE

## 2023-10-17 PROCEDURE — 3075F PR MOST RECENT SYSTOLIC BLOOD PRESS GE 130-139MM HG: ICD-10-PCS | Mod: CPTII,,, | Performed by: INTERNAL MEDICINE

## 2023-10-17 PROCEDURE — 3066F NEPHROPATHY DOC TX: CPT | Mod: CPTII,,, | Performed by: INTERNAL MEDICINE

## 2023-10-17 PROCEDURE — 90471 IMMUNIZATION ADMIN: CPT | Mod: ,,, | Performed by: INTERNAL MEDICINE

## 2023-10-17 PROCEDURE — 3008F PR BODY MASS INDEX (BMI) DOCUMENTED: ICD-10-PCS | Mod: CPTII,,, | Performed by: INTERNAL MEDICINE

## 2023-10-17 PROCEDURE — 3061F NEG MICROALBUMINURIA REV: CPT | Mod: CPTII,,, | Performed by: INTERNAL MEDICINE

## 2023-10-17 PROCEDURE — 3044F PR MOST RECENT HEMOGLOBIN A1C LEVEL <7.0%: ICD-10-PCS | Mod: CPTII,,, | Performed by: INTERNAL MEDICINE

## 2023-10-17 PROCEDURE — 3079F PR MOST RECENT DIASTOLIC BLOOD PRESSURE 80-89 MM HG: ICD-10-PCS | Mod: CPTII,,, | Performed by: INTERNAL MEDICINE

## 2023-10-17 PROCEDURE — 90694 VACC AIIV4 NO PRSRV 0.5ML IM: CPT | Mod: ,,, | Performed by: INTERNAL MEDICINE

## 2023-10-23 ENCOUNTER — DOCUMENTATION ONLY (OUTPATIENT)
Dept: PRIMARY CARE CLINIC | Facility: CLINIC | Age: 63
End: 2023-10-23
Payer: COMMERCIAL

## 2023-10-23 DIAGNOSIS — Z79.890 ENCOUNTER FOR MONITORING TESTOSTERONE REPLACEMENT THERAPY: ICD-10-CM

## 2023-10-23 DIAGNOSIS — E29.1 HYPOGONADISM IN MALE: Primary | ICD-10-CM

## 2023-10-23 DIAGNOSIS — Z51.81 ENCOUNTER FOR MONITORING TESTOSTERONE REPLACEMENT THERAPY: ICD-10-CM

## 2023-10-24 ENCOUNTER — DOCUMENTATION ONLY (OUTPATIENT)
Dept: ADMINISTRATIVE | Facility: HOSPITAL | Age: 63
End: 2023-10-24
Payer: COMMERCIAL

## 2023-11-02 ENCOUNTER — PATIENT OUTREACH (OUTPATIENT)
Dept: ADMINISTRATIVE | Facility: HOSPITAL | Age: 63
End: 2023-11-02
Payer: COMMERCIAL

## 2023-11-02 ENCOUNTER — TELEPHONE (OUTPATIENT)
Dept: PRIMARY CARE CLINIC | Facility: CLINIC | Age: 63
End: 2023-11-02
Payer: COMMERCIAL

## 2023-11-02 NOTE — TELEPHONE ENCOUNTER
----- Message from Mellissa Owens sent at 11/2/2023  4:47 PM CDT -----  .Type:  Needs Medical Advice    Who Called: pt   Symptoms (please be specific):    How long has patient had these symptoms:    Pharmacy name and phone #:    Would the patient rather a call back or a response via [facility]? Call back   Best Call Back Number: [phone]  Additional Information: pt just got a steroid shot and information shots and will be scheduled for am MRI.. if you have any questions give him a call

## 2023-11-13 RX ORDER — TESTOSTERONE CYPIONATE 1000 MG/10ML
INJECTION, SOLUTION INTRAMUSCULAR
Qty: 10 ML | Refills: 0 | Status: SHIPPED | OUTPATIENT
Start: 2023-11-13 | End: 2024-02-14

## 2023-12-04 ENCOUNTER — PATIENT MESSAGE (OUTPATIENT)
Dept: NEUROLOGY | Facility: CLINIC | Age: 63
End: 2023-12-04
Payer: COMMERCIAL

## 2023-12-05 ENCOUNTER — OFFICE VISIT (OUTPATIENT)
Dept: NEUROLOGY | Facility: CLINIC | Age: 63
End: 2023-12-05
Payer: COMMERCIAL

## 2023-12-05 VITALS
BODY MASS INDEX: 33.75 KG/M2 | SYSTOLIC BLOOD PRESSURE: 148 MMHG | HEIGHT: 66 IN | DIASTOLIC BLOOD PRESSURE: 84 MMHG | WEIGHT: 210 LBS

## 2023-12-05 DIAGNOSIS — G47.33 OBSTRUCTIVE SLEEP APNEA SYNDROME: Primary | ICD-10-CM

## 2023-12-05 PROCEDURE — 3077F SYST BP >= 140 MM HG: CPT | Mod: CPTII,S$GLB,, | Performed by: NURSE PRACTITIONER

## 2023-12-05 PROCEDURE — 99213 OFFICE O/P EST LOW 20 MIN: CPT | Mod: S$GLB,,, | Performed by: NURSE PRACTITIONER

## 2023-12-05 PROCEDURE — 99999 PR PBB SHADOW E&M-EST. PATIENT-LVL III: CPT | Mod: PBBFAC,,, | Performed by: NURSE PRACTITIONER

## 2023-12-05 PROCEDURE — 3044F PR MOST RECENT HEMOGLOBIN A1C LEVEL <7.0%: ICD-10-PCS | Mod: CPTII,S$GLB,, | Performed by: NURSE PRACTITIONER

## 2023-12-05 PROCEDURE — 3077F PR MOST RECENT SYSTOLIC BLOOD PRESSURE >= 140 MM HG: ICD-10-PCS | Mod: CPTII,S$GLB,, | Performed by: NURSE PRACTITIONER

## 2023-12-05 PROCEDURE — 3061F PR NEG MICROALBUMINURIA RESULT DOCUMENTED/REVIEW: ICD-10-PCS | Mod: CPTII,S$GLB,, | Performed by: NURSE PRACTITIONER

## 2023-12-05 PROCEDURE — 3008F BODY MASS INDEX DOCD: CPT | Mod: CPTII,S$GLB,, | Performed by: NURSE PRACTITIONER

## 2023-12-05 PROCEDURE — 1159F PR MEDICATION LIST DOCUMENTED IN MEDICAL RECORD: ICD-10-PCS | Mod: CPTII,S$GLB,, | Performed by: NURSE PRACTITIONER

## 2023-12-05 PROCEDURE — 99999 PR PBB SHADOW E&M-EST. PATIENT-LVL III: ICD-10-PCS | Mod: PBBFAC,,, | Performed by: NURSE PRACTITIONER

## 2023-12-05 PROCEDURE — 1159F MED LIST DOCD IN RCRD: CPT | Mod: CPTII,S$GLB,, | Performed by: NURSE PRACTITIONER

## 2023-12-05 PROCEDURE — 3008F PR BODY MASS INDEX (BMI) DOCUMENTED: ICD-10-PCS | Mod: CPTII,S$GLB,, | Performed by: NURSE PRACTITIONER

## 2023-12-05 PROCEDURE — 3079F PR MOST RECENT DIASTOLIC BLOOD PRESSURE 80-89 MM HG: ICD-10-PCS | Mod: CPTII,S$GLB,, | Performed by: NURSE PRACTITIONER

## 2023-12-05 PROCEDURE — 3066F NEPHROPATHY DOC TX: CPT | Mod: CPTII,S$GLB,, | Performed by: NURSE PRACTITIONER

## 2023-12-05 PROCEDURE — 3066F PR DOCUMENTATION OF TREATMENT FOR NEPHROPATHY: ICD-10-PCS | Mod: CPTII,S$GLB,, | Performed by: NURSE PRACTITIONER

## 2023-12-05 PROCEDURE — 3061F NEG MICROALBUMINURIA REV: CPT | Mod: CPTII,S$GLB,, | Performed by: NURSE PRACTITIONER

## 2023-12-05 PROCEDURE — 3079F DIAST BP 80-89 MM HG: CPT | Mod: CPTII,S$GLB,, | Performed by: NURSE PRACTITIONER

## 2023-12-05 PROCEDURE — 3044F HG A1C LEVEL LT 7.0%: CPT | Mod: CPTII,S$GLB,, | Performed by: NURSE PRACTITIONER

## 2023-12-05 PROCEDURE — 99213 PR OFFICE/OUTPT VISIT, EST, LEVL III, 20-29 MIN: ICD-10-PCS | Mod: S$GLB,,, | Performed by: NURSE PRACTITIONER

## 2023-12-05 NOTE — PROGRESS NOTES
Established [facility] Patient   SUBJECTIVE:    Patient ID: Jason Choi , 63 y.o.    Past Medical History:   Diagnosis Date    BPH (benign prostatic hyperplasia)     Calcaneal spur of foot     Decreased testosterone level     Diverticulosis     Lipoma of forearm     Male erectile dysfunction, unspecified     LAWSON on CPAP     Tubular adenoma of colon     Type 2 diabetes mellitus without complications        Past Surgical History:   Procedure Laterality Date    COLON BIOPSY  10/04/2021    HIP REPLACEMENT ARTHROPLASTY Right 12/20/2022    LIPOMA RESECTION         Review of patient's allergies indicates:  No Known Allergies    Chief Complaint: LAWSON f/u    History of Present Illness:     Here for PAP follow up. Wears CPAP qhs and is tolerating it well. Sleeps 12 hrs a night and awakens 1 time due to nocturia and is able to go right back to sleep.     Awakens refreshed and denies EDS. Does not nap.     Pt washes mask and tubing on a regular basis. States he changed the humidification on his CPAP per [place]. States there was moisture in his mahiene. DME-[facility].       Review of Systems - as per [facility], otherwise a balanced 10 systems review is negative.      Current Medications:  Current Outpatient Medications   Medication Instructions    ascorbic acid/zinc (ZINC WITH VITAMIN C ORAL) Oral    aspirin 81 mg Cap aspirin Take No date recorded No form recorded No frequency recorded No route recorded No set duration recorded No set duration amount recorded active No dosage strength recorded No dosage strength units of measure recorded    azelastine (ASTELIN) 137 mcg (0.1 %) nasal spray Nasal    diphenhydramine HCl (UNISOM, DIPHENHYDRAMINE, ORAL) Oral, Nightly PRN    docosahexaenoic acid/epa (FISH OIL ORAL) 500 mg, Oral, Daily    fluticasone propionate (FLONASE) 50 mcg, Each Nostril, Daily    HYDROcodone-acetaminophen (NORCO) 5-325 mg per tablet 1 tablet, Oral, Every 4 hours PRN    melatonin 10 mg Tab Oral    MEN'S MULTI-VITAMIN ORAL  "Oral    multivitamin with iron Tab   0 Refill(s)    MYRBETRIQ 50 mg Tb24 No dose, route, or frequency recorded.    red yeast rice 1,200 mg, Oral    sildenafiL (VIAGRA) 100 mg, Oral, As needed (PRN)    solifenacin (VESICARE) 5 mg, Oral, Daily    tamsulosin (FLOMAX) 0.4 mg Cap TAKE ONE CAPSULE BY MOUTH DAILY    testosterone cypionate (DEPOTESTOTERONE CYPIONATE) 100 mg/mL injection INJECT 1.5MLS (150MG) INTO THE MUSCLE EVERY 14 DAYS    TUMERIC-GING-OLIVE-OREG-CAPRYL ORAL Oral         OBJECTIVE:    Vitals:  BP (!) 148/84   Ht 5' 6" (1.676 m)   Wt 95.3 kg (210 lb)   BMI 33.89 kg/m²      Physical Exam:  Constitutional  he appears well-developed and well-nourished. he is well groomed.    Accompanied by - self  Appearance - well appearing, no apparent distress, unassisted  Heart - RRR auscultated without murmur  Lungs - CTA   Skin- no obvious lesions noted    Neurologic  Cortical function - The patient is alert, attentive, and oriented  Speech - clear   Cranial nerves:  CN 3, 4, 6 EOMs - normal. No ptosis or lateral gaze deviation  CN 7 - no face asymmetry; normal eye closure and smile  CN 8 - hearing is grossly normal  Motor - grossly normal  Gait - unassisted; posture upright. gait is steady with normal steps    Review of Data:      PAP Compliance Report  Last 30 days  Usage- 100  Usage > 4 hrs - 100  AHI - 0.6        12/5/2023     1:28 PM   EPWORTH SLEEPINESS SCALE   Sitting and reading 0   Watching TV 0   Sitting, inactive in a public place (e.g. a theatre or a meeting) 0   As a passenger in a car for an hour without a break 0   Lying down to rest in the afternoon when circumstances permit 0   Sitting and talking to someone 0   Sitting quietly after a lunch without alcohol 0              ASSESSMENT /PLAN:    Problem List Items Addressed This Visit          Other    Obstructive sleep apnea syndrome - Primary    - Continues to benefit from PAP therapy. Encouraged nightly use of PAP.   - Drowsy driving may still occur " despite PAP use.   - F/u in 1 yr       Questions and concerns were sought and answered to the patient's stated verbal satisfaction.    The patient verbalizes understanding and agreement with the above stated treatment plan.     Items discussed include acute and/or chronic neurological, sleep, or other issues and their attendant differential diagnoses.  Potential for additional testing, treatment options, and prognosis also discussed.    __*_single dx ___multiple issues/ diagnoses  ___ low __* mod ___ high complexity of data  __*_low __mod ___ high risks     Medical Decision Making (MDM) used for CPT choice:  _*__low  ___moderate  ____high        MOHSEN Garrido  [facility]  [phone]

## 2023-12-07 ENCOUNTER — TELEPHONE (OUTPATIENT)
Dept: PRIMARY CARE CLINIC | Facility: CLINIC | Age: 63
End: 2023-12-07
Payer: COMMERCIAL

## 2023-12-07 NOTE — TELEPHONE ENCOUNTER
----- Message from Hernan Butler sent at 12/7/2023  4:02 PM CST -----  .Type:  Patient Returning Call    Who Called:pt   Who Left Message for Patient:Julianna/ Valerie  Does the patient know what this is regarding?:update on shoulder   Would the patient rather a call back or a response via [facility]? Call back   Best Call Back Number:[phone]  Additional Information: pt states that he fell on the shoulder and would need possible sx on it.  Called the back line pt is with a pt.

## 2023-12-28 ENCOUNTER — TELEPHONE (OUTPATIENT)
Dept: PRIMARY CARE CLINIC | Facility: CLINIC | Age: 63
End: 2023-12-28
Payer: COMMERCIAL

## 2023-12-28 VITALS — SYSTOLIC BLOOD PRESSURE: 131 MMHG | DIASTOLIC BLOOD PRESSURE: 76 MMHG

## 2024-01-01 NOTE — PROGRESS NOTES
Thu Soriano MD   [id number] [id number]     Patient ID: [id number]     Chief Complaint: Wellness exam        HPI:     Jason Choi is a 63 y.o. male here today for annual Wellness. He is still working so the BC is his primary. He notes that his shoulder has been hurting, he went to chiro for over a month. It's ok at times and others he can't lift with the arm. He is waiting for his follow up with Dr Cochran to get referral to a shoulder specialist.       Subjective:     Review of Systems   Constitutional:  Positive for malaise/fatigue.   HENT:  Positive for hearing loss.         Got hearing aides at [facility] but didn't wear today since he knew I'd want to check ears   Eyes:         Did eye in July, he got his glasses at [facility] and the price was excellent, under 100 and he'd had over 300 last year   Respiratory: Negative.     Cardiovascular: Negative.    Gastrointestinal:  Positive for abdominal pain and constipation.        Some pain around the umbilicus at times. He had a GI virus this last weekend.    Genitourinary:         Goes once a year to urology   Musculoskeletal:  Positive for joint pain and myalgias.   Skin: Negative.         Always has some scrapes, planning pedicure soon for nails   Neurological:  Positive for focal weakness.        R shoulder, intermittent weakness   Endo/Heme/Allergies:         Sugars have been fine   Psychiatric/Behavioral: Negative.         Past Medical History:   Diagnosis Date    BPH (benign prostatic hyperplasia)     Calcaneal spur of foot     Decreased testosterone level     Diverticulosis     Lipoma of forearm     Male erectile dysfunction, unspecified     LAWSON on CPAP     Tubular adenoma of colon     Type 2 diabetes mellitus without complications         Past Surgical History:   Procedure Laterality Date    COLON BIOPSY  10/04/2021    HIP REPLACEMENT ARTHROPLASTY Right 12/20/2022    LIPOMA RESECTION         Family History   Problem Relation Age of Onset    Diabetes  Mother     COPD Mother     Hypertension Father     Prostate cancer Father     Kidney disease Father     Diabetes Father     Osteoarthritis Father     Throat cancer Sister     Hypertension Sister     Diabetes Sister     Hypertension Brother     Atrial fibrillation Brother         Social History     Socioeconomic History    Marital status:    Tobacco Use    Smoking status: Never    Smokeless tobacco: Never   Substance and Sexual Activity    Alcohol use: Yes     Alcohol/week: 2.0 standard drinks of alcohol     Types: 2 Cans of beer per week    Drug use: Never    Sexual activity: Yes       Review of patient's allergies indicates:  No Known Allergies    Outpatient Medications Marked as Taking for the 10/17/23 encounter (Office Visit) with Thu Soriano MD   Medication Sig Dispense Refill    aspirin 81 mg Cap aspirin Take No date recorded No form recorded No frequency recorded No route recorded No set duration recorded No set duration amount recorded active No dosage strength recorded No dosage strength units of measure recorded      azelastine (ASTELIN) 137 mcg (0.1 %) nasal spray by Nasal route.      docosahexaenoic acid/epa (FISH OIL ORAL) Take 500 mg by mouth once daily at 6am.      fluticasone propionate (FLONASE) 50 mcg/actuation nasal spray 1 spray (50 mcg total) by Each Nostril route once daily. 16 g 3    melatonin 10 mg Tab Take by mouth.      MEN'S MULTI-VITAMIN ORAL Take by mouth.      MYRBETRIQ 50 mg Tb24       red yeast rice 600 mg Cap Take 1,200 mg by mouth.      sildenafiL (VIAGRA) 100 MG tablet Take 100 mg by mouth as needed.      solifenacin (VESICARE) 5 MG tablet Take 5 mg by mouth once daily.      tamsulosin (FLOMAX) 0.4 mg Cap TAKE ONE CAPSULE BY MOUTH DAILY 30 capsule 5    TUMERIC-GING-OLIVE-OREG-CAPRYL ORAL Take by mouth.         Patient Care Team:  Thu Soriano MD as PCP - General (Internal Medicine)  Cali Salmon MD as Consulting Physician (Urology)  Roque Bojorquez MD as  "Consulting Physician (Otolaryngology)  Lizzie Corea MD as Consulting Physician (Cardiology)  Jose Raul Nolen as Consulting Physician (Dermatology)  Joseph De La Vega MD as Consulting Physician (Gastroenterology)  KALLIE Cochran MD (Orthopedic Surgery)  Itzel Figueroa LPN as Licensed Practical Nurse       Objective:     /81   Pulse 66   Temp 98.7 °F (37.1 °C) (Oral)   Resp 15   Ht 5' 6" (1.676 m)   Wt 94.3 kg (208 lb)   SpO2 97%   BMI 33.57 kg/m²     Physical Exam  Vitals reviewed.   Constitutional:       General: He is not in acute distress.  HENT:      Head: Normocephalic and atraumatic.      Right Ear: Tympanic membrane, ear canal and external ear normal.      Left Ear: Tympanic membrane, ear canal and external ear normal.      Mouth/Throat:      Mouth: Mucous membranes are moist.      Pharynx: No posterior oropharyngeal erythema.   Eyes:      General: Scleral icterus present.      Extraocular Movements: Extraocular movements intact.      Conjunctiva/sclera: Conjunctivae normal.      Pupils: Pupils are equal, round, and reactive to light.   Neck:      Vascular: No carotid bruit.   Cardiovascular:      Rate and Rhythm: Normal rate and regular rhythm.      Pulses:           Dorsalis pedis pulses are 3+ on the right side and 3+ on the left side.        Posterior tibial pulses are 3+ on the right side and 3+ on the left side.      Heart sounds: No murmur heard.     No gallop.   Pulmonary:      Effort: Pulmonary effort is normal.      Breath sounds: Normal breath sounds.   Abdominal:      General: Bowel sounds are normal.      Palpations: Abdomen is soft.      Tenderness: There is no abdominal tenderness. There is no guarding.   Musculoskeletal:         General: Tenderness present.      Cervical back: Neck supple.      Right foot: No deformity.      Left foot: No deformity.      Comments: R shoulder pain with ROM   Feet:      Right foot:      Protective Sensation: 10 sites tested.  10 sites sensed.      " Skin integrity: Dry skin present.      Toenail Condition: Right toenails are long. Fungal disease present.     Left foot:      Protective Sensation: 10 sites tested.  10 sites sensed.      Skin integrity: Dry skin present.      Toenail Condition: Left toenails are long.   Lymphadenopathy:      Cervical: No cervical adenopathy.   Skin:     General: Skin is warm and dry.      Findings: No bruising.   Neurological:      General: No focal deficit present.      Mental Status: He is alert and oriented to person, place, and time.      Motor: No weakness.      Gait: Gait normal.   Psychiatric:         Mood and Affect: Mood normal.         Behavior: Behavior normal.         Thought Content: Thought content normal.         Judgment: Judgment normal.               Assessment/Plan:     1. Wellness examination    2. Type 2 diabetes mellitus without complication, without long-term current use of insulin    3. Primary hypertension    4. Benign prostatic hyperplasia, unspecified whether lower urinary tract symptoms present    5. Elevated PSA  -     PSA, Total (Diagnostic); Future; Expected date: 11/17/2023    6. Decreased testosterone level  -     Testosterone; Future; Expected date: 11/17/2023    7. Adenomatous polyp of colon, unspecified part of colon    8. Encounter for monitoring testosterone replacement therapy  -     Testosterone; Future; Expected date: 11/17/2023  -     PSA, Total (Diagnostic); Future; Expected date: 11/17/2023  -     CBC Auto Differential; Future; Expected date: 11/17/2023    Other orders  -     Influenza (FLUAD) - Quadrivalent (Adjuvanted) *Preferred* (65+) (PF)             Follow up in about 3 months (around 1/17/2024) for Medication Managment. In addition to their scheduled follow up, the patient has also been instructed to follow up on as needed basis.     Signature:  Thu Soriano MD  [facility]  [id number] [id number]     (2) good, crying

## 2024-01-15 RX ORDER — SYRINGE WITH NEEDLE, 1 ML 25GX5/8"
SYRINGE, EMPTY DISPOSABLE MISCELLANEOUS
Qty: 25 EACH | Refills: 3 | Status: SHIPPED | OUTPATIENT
Start: 2024-01-15

## 2024-01-15 RX ORDER — NEEDLES, DISPOSABLE 25GX5/8"
NEEDLE, DISPOSABLE MISCELLANEOUS
Qty: 25 EACH | Refills: 3 | Status: SHIPPED | OUTPATIENT
Start: 2024-01-15

## 2024-02-14 RX ORDER — TESTOSTERONE CYPIONATE 1000 MG/10ML
INJECTION, SOLUTION INTRAMUSCULAR
Qty: 10 ML | Refills: 0 | Status: SHIPPED | OUTPATIENT
Start: 2024-02-14 | End: 2024-05-29 | Stop reason: SDUPTHER

## 2024-02-14 NOTE — TELEPHONE ENCOUNTER
Spoke to patient, he could not remember.  Spoke with [facility] stated last PSA was 4/4/23, alst testosterone was 4/4/23 & 10/12/23  Patient does have an upcoming appt 4/4/24 with labs.  They are faxing results to us

## 2024-04-11 ENCOUNTER — LAB VISIT (OUTPATIENT)
Dept: LAB | Facility: HOSPITAL | Age: 64
End: 2024-04-11
Attending: INTERNAL MEDICINE
Payer: COMMERCIAL

## 2024-04-11 DIAGNOSIS — Z79.890 ENCOUNTER FOR MONITORING TESTOSTERONE REPLACEMENT THERAPY: ICD-10-CM

## 2024-04-11 DIAGNOSIS — E29.1 3-OXO-5 ALPHA-STEROID DELTA 4-DEHYDROGENASE DEFICIENCY: Primary | ICD-10-CM

## 2024-04-11 DIAGNOSIS — Z51.81 ENCOUNTER FOR MONITORING TESTOSTERONE REPLACEMENT THERAPY: ICD-10-CM

## 2024-04-11 DIAGNOSIS — R79.89 DECREASED TESTOSTERONE LEVEL: ICD-10-CM

## 2024-04-11 DIAGNOSIS — R97.20 ELEVATED PSA: ICD-10-CM

## 2024-04-11 LAB
ALBUMIN SERPL-MCNC: 3.8 G/DL (ref 3.4–4.8)
ALBUMIN/GLOB SERPL: 1.2 RATIO (ref 1.1–2)
ALP SERPL-CCNC: 49 UNIT/L (ref 40–150)
ALT SERPL-CCNC: 24 UNIT/L (ref 0–55)
AST SERPL-CCNC: 22 UNIT/L (ref 5–34)
BASOPHILS # BLD AUTO: 0.03 X10(3)/MCL
BASOPHILS NFR BLD AUTO: 0.4 %
BILIRUB SERPL-MCNC: 0.5 MG/DL
BUN SERPL-MCNC: 16 MG/DL (ref 8.4–25.7)
CALCIUM SERPL-MCNC: 9.9 MG/DL (ref 8.8–10)
CHLORIDE SERPL-SCNC: 106 MMOL/L (ref 98–107)
CO2 SERPL-SCNC: 28 MMOL/L (ref 23–31)
CREAT SERPL-MCNC: 1.18 MG/DL (ref 0.73–1.18)
EOSINOPHIL # BLD AUTO: 0.05 X10(3)/MCL (ref 0–0.9)
EOSINOPHIL NFR BLD AUTO: 0.7 %
ERYTHROCYTE [DISTWIDTH] IN BLOOD BY AUTOMATED COUNT: 13.4 % (ref 11.5–17)
ESTRADIOL SERPL HS-MCNC: 40 PG/ML
GFR SERPLBLD CREATININE-BSD FMLA CKD-EPI: >60 MLS/MIN/1.73/M2
GLOBULIN SER-MCNC: 3.3 GM/DL (ref 2.4–3.5)
GLUCOSE SERPL-MCNC: 108 MG/DL (ref 82–115)
HCT VFR BLD AUTO: 48.7 % (ref 42–52)
HGB BLD-MCNC: 15.8 G/DL (ref 14–18)
IMM GRANULOCYTES # BLD AUTO: 0.01 X10(3)/MCL (ref 0–0.04)
IMM GRANULOCYTES NFR BLD AUTO: 0.1 %
LYMPHOCYTES # BLD AUTO: 2.16 X10(3)/MCL (ref 0.6–4.6)
LYMPHOCYTES NFR BLD AUTO: 31.8 %
MCH RBC QN AUTO: 29.5 PG (ref 27–31)
MCHC RBC AUTO-ENTMCNC: 32.4 G/DL (ref 33–36)
MCV RBC AUTO: 90.9 FL (ref 80–94)
MONOCYTES # BLD AUTO: 0.48 X10(3)/MCL (ref 0.1–1.3)
MONOCYTES NFR BLD AUTO: 7.1 %
NEUTROPHILS # BLD AUTO: 4.06 X10(3)/MCL (ref 2.1–9.2)
NEUTROPHILS NFR BLD AUTO: 59.9 %
PLATELET # BLD AUTO: 272 X10(3)/MCL (ref 130–400)
PMV BLD AUTO: 9.5 FL (ref 7.4–10.4)
POTASSIUM SERPL-SCNC: 4.9 MMOL/L (ref 3.5–5.1)
PROT SERPL-MCNC: 7.1 GM/DL (ref 5.8–7.6)
PSA SERPL-MCNC: 4.19 NG/ML
RBC # BLD AUTO: 5.36 X10(6)/MCL (ref 4.7–6.1)
SODIUM SERPL-SCNC: 141 MMOL/L (ref 136–145)
TESTOST SERPL-MCNC: 593.04 NG/DL (ref 220.91–715.81)
WBC # SPEC AUTO: 6.79 X10(3)/MCL (ref 4.5–11.5)

## 2024-04-11 PROCEDURE — 85025 COMPLETE CBC W/AUTO DIFF WBC: CPT

## 2024-04-11 PROCEDURE — 80053 COMPREHEN METABOLIC PANEL: CPT

## 2024-04-11 PROCEDURE — 82670 ASSAY OF TOTAL ESTRADIOL: CPT

## 2024-04-11 PROCEDURE — 84403 ASSAY OF TOTAL TESTOSTERONE: CPT

## 2024-04-11 PROCEDURE — 84153 ASSAY OF PSA TOTAL: CPT

## 2024-04-11 PROCEDURE — 36415 COLL VENOUS BLD VENIPUNCTURE: CPT

## 2024-04-15 ENCOUNTER — TELEPHONE (OUTPATIENT)
Dept: PRIMARY CARE CLINIC | Facility: CLINIC | Age: 64
End: 2024-04-15
Payer: COMMERCIAL

## 2024-04-16 NOTE — PROGRESS NOTES
Thu Soriano MD   [address] [id number]     Patient ID: [id number]     Chief Complaint: Follow-up (6 month follow-up//May 23 appointment with Cardio checkup.)        HPI:     Jason Choi is a 63 y.o. male here today for a follow up of DM, HTN, colon polyp, LAWSON and BPH with elevated PSA. He is in therapy after the rotator cuff surgery. He had a bad reaction with the Percocet, he got really anxious/agitated. He doesn't want to take that ever again      Subjective:     Review of Systems   HENT: Negative.     Respiratory: Negative.     Cardiovascular: Negative.    Gastrointestinal:  Positive for constipation.        After surgery both hip and shoulder, he's on stool softener and it's helped   Genitourinary: Negative.    Endo/Heme/Allergies:         He stopped the Honey Buns at night       Past Medical History:   Diagnosis Date    BPH (benign prostatic hyperplasia)     Calcaneal spur of foot     Decreased testosterone level     Diverticulosis     Lipoma of forearm     Male erectile dysfunction, unspecified     LAWSON on CPAP     Tubular adenoma of colon     Type 2 diabetes mellitus without complications         Past Surgical History:   Procedure Laterality Date    COLON BIOPSY  10/04/2021    HIP REPLACEMENT ARTHROPLASTY Right 12/20/2022    LIPOMA RESECTION      ROTATOR CUFF REPAIR  12/22/2023       Family History   Problem Relation Name Age of Onset    Diabetes Mother      COPD Mother      Hypertension Father      Prostate cancer Father      Kidney disease Father      Diabetes Father      Osteoarthritis Father      Throat cancer Sister      Hypertension Sister      Diabetes Sister      Hypertension Brother      Atrial fibrillation Brother          Social History     Socioeconomic History    Marital status:    Tobacco Use    Smoking status: Never    Smokeless tobacco: Never   Substance and Sexual Activity    Alcohol use: Yes     Alcohol/week: 2.0 standard drinks of alcohol     Types: 2 Cans of beer per  "week    Drug use: Never    Sexual activity: Yes     Social Determinants of Health     Financial Resource Strain: Low Risk  (4/17/2024)    Overall Financial Resource Strain (CARDIA)     Difficulty of Paying Living Expenses: Not hard at all   Food Insecurity: No Food Insecurity (4/17/2024)    Hunger Vital Sign     Worried About Running Out of Food in the Last Year: Never true     Ran Out of Food in the Last Year: Never true   Transportation Needs: No Transportation Needs (4/17/2024)    PRAPARE - Transportation     Lack of Transportation (Medical): No     Lack of Transportation (Non-Medical): No   Physical Activity: Insufficiently Active (4/17/2024)    Exercise Vital Sign     Days of Exercise per Week: 2 days     Minutes of Exercise per Session: 10 min   Stress: No Stress Concern Present (4/17/2024)    [place] [place] [employer] - Occupational Stress Questionnaire     Feeling of Stress : Not at all   Social Connections: Moderately Isolated (4/17/2024)    Social Connection and Isolation Panel [NHANES]     Frequency of Communication with Friends and Family: Three times a week     Frequency of Social Gatherings with Friends and Family: Three times a week     Attends Adventism Services: Never     Active Member of Clubs or Organizations: No     Attends Club or Organization Meetings: Never     Marital Status:    Housing Stability: Unknown (4/17/2024)    Housing Stability Vital Sign     Unable to Pay for Housing in the Last Year: No     Homeless in the Last Year: No       Review of patient's allergies indicates:  No Known Allergies    Current Outpatient Medications   Medication Sig Dispense Refill    ascorbic acid/zinc (ZINC WITH VITAMIN C ORAL) Take by mouth once daily.      aspirin 81 mg Cap Take 81 mg by mouth Daily.      azelastine (ASTELIN) 137 mcg (0.1 %) nasal spray by Nasal route as needed for Rhinitis.      BD LUER-JACKELINE SYRINGE 3 mL 23 x 1" Syrg USE AS DIRECTED TO INJECT TESTOSTERONE EVERY 2 WEEKS " "25 each 3    BD REGULAR BEVEL NEEDLES 18 gauge x 1" Ndle USE AS DIRECTED TO DRAW UP TESTOSTERONE EVERY 2 WEEKS 25 each 3    docosahexaenoic acid/epa (FISH OIL ORAL) Take 500 mg by mouth once daily at 6am.      fluticasone propionate (FLONASE) 50 mcg/actuation nasal spray 1 spray (50 mcg total) by Each Nostril route once daily. 16 g 3    HYDROcodone-acetaminophen (NORCO) 5-325 mg per tablet Take 1 tablet by mouth every 4 (four) hours as needed.      melatonin 10 mg Tab Take 10 mg by mouth every evening.      MEN'S MULTI-VITAMIN ORAL Take by mouth once daily.      MYRBETRIQ 50 mg Tb24 Take by mouth every morning.      red yeast rice 600 mg Cap Take 1,200 mg by mouth once daily.      sildenafiL (VIAGRA) 100 MG tablet Take 100 mg by mouth as needed.      solifenacin (VESICARE) 5 MG tablet Take 5 mg by mouth once daily.      tamsulosin (FLOMAX) 0.4 mg Cap TAKE ONE CAPSULE BY MOUTH DAILY 30 capsule 5    testosterone cypionate (DEPOTESTOTERONE CYPIONATE) 100 mg/mL injection INJECT 1.5MLS (150MG) INTO THE MUSCLE EVERY 14 DAYS 10 mL 0     No current facility-administered medications for this visit.       Patient Care Team:  Thu Soriano MD as PCP - General (Internal Medicine)  Cali Salmon MD as Consulting Physician (Urology)  Roque Bojorquez MD as Consulting Physician (Otolaryngology)  Lizzie Corea MD as Consulting Physician (Cardiology)  Jose Raul Nolen as Consulting Physician (Dermatology)  Joseph De La Vega MD as Consulting Physician (Gastroenterology)  KALLIE Cochran MD (Orthopedic Surgery)  Salas Null as Consulting Provider (Optometry)  Luann Taveras LPN as Care Coordinator  Cali Salmon MD as Consulting Physician (Urology)       Objective:     /83 (BP Location: Right arm, Patient Position: Sitting, BP Method: Medium (Automatic))   Pulse 78   Temp 98.2 °F (36.8 °C)   Resp 17   Ht 5' 6" (1.676 m)   Wt 95.3 kg (210 lb)   SpO2 96%   BMI 33.89 kg/m²     Physical " Exam  Vitals reviewed.   Cardiovascular:      Rate and Rhythm: Normal rate and regular rhythm.   Pulmonary:      Effort: Pulmonary effort is normal.      Breath sounds: Normal breath sounds.   Abdominal:      General: Bowel sounds are normal.      Palpations: Abdomen is soft.      Hernia: A hernia is present.   Skin:     General: Skin is warm and dry.   Neurological:      Mental Status: He is alert.   Psychiatric:         Mood and Affect: Mood normal.         Behavior: Behavior normal.         Thought Content: Thought content normal.         Judgment: Judgment normal.             Lab Results   Component Value Date    HGBA1C 5.8 10/12/2023     Lab Visit on 04/11/2024   Component Date Value    Testosterone Total 04/11/2024 593.04     Prostate Specific Antigen 04/11/2024 4.19 (H)     Sodium Level 04/11/2024 141     Potassium Level 04/11/2024 4.9     Chloride 04/11/2024 106     Carbon Dioxide 04/11/2024 28     Glucose Level 04/11/2024 108     Blood Urea Nitrogen 04/11/2024 16.0     Creatinine 04/11/2024 1.18     Calcium Level Total 04/11/2024 9.9     Protein Total 04/11/2024 7.1     Albumin Level 04/11/2024 3.8     Globulin 04/11/2024 3.3     Albumin/Globulin Ratio 04/11/2024 1.2     Bilirubin Total 04/11/2024 0.5     Alkaline Phosphatase 04/11/2024 49     Alanine Aminotransferase 04/11/2024 24     Aspartate Aminotransfera* 04/11/2024 22     eGFR 04/11/2024 >60     Estradiol Level 04/11/2024 40     WBC 04/11/2024 6.79     RBC 04/11/2024 5.36     Hgb 04/11/2024 15.8     Hct 04/11/2024 48.7     MCV 04/11/2024 90.9     MCH 04/11/2024 29.5     MCHC 04/11/2024 32.4 (L)     RDW 04/11/2024 13.4     Platelet 04/11/2024 272     MPV 04/11/2024 9.5     Neut % 04/11/2024 59.9     Lymph % 04/11/2024 31.8     Mono % 04/11/2024 7.1     Eos % 04/11/2024 0.7     Basophil % 04/11/2024 0.4     Lymph # 04/11/2024 2.16     Neut # 04/11/2024 4.06     Mono # 04/11/2024 0.48     Eos # 04/11/2024 0.05     Baso # 04/11/2024 0.03     IG#  04/11/2024 0.01     IG% 04/11/2024 0.1        Assessment/Plan:     1. Type 2 diabetes mellitus without complication, without long-term current use of insulin  Comments:  He was to get A1C here but the machine is down, will return next week to do A1C    2. Primary hypertension  Comments:  Acceptable, I'd like him under 130 but with pain from shoulder will not adjust now.    3. Decreased testosterone level  Comments:  Just did his lab for Neusetzer    4. S/P right rotator cuff repair  Comments:  Off work and in therapy             Follow up in about 6 months (around 10/17/2024) for Wellness. In addition to their scheduled follow up, the patient has also been instructed to follow up on as needed basis.     Signature:  Thu Soriano MD  [facility]  [id number] [id number]

## 2024-04-17 ENCOUNTER — OFFICE VISIT (OUTPATIENT)
Dept: PRIMARY CARE CLINIC | Facility: CLINIC | Age: 64
End: 2024-04-17
Payer: COMMERCIAL

## 2024-04-17 VITALS
DIASTOLIC BLOOD PRESSURE: 83 MMHG | OXYGEN SATURATION: 96 % | HEART RATE: 78 BPM | TEMPERATURE: 98 F | WEIGHT: 210 LBS | BODY MASS INDEX: 33.75 KG/M2 | HEIGHT: 66 IN | RESPIRATION RATE: 17 BRPM | SYSTOLIC BLOOD PRESSURE: 138 MMHG

## 2024-04-17 DIAGNOSIS — R79.89 DECREASED TESTOSTERONE LEVEL: ICD-10-CM

## 2024-04-17 DIAGNOSIS — E11.9 TYPE 2 DIABETES MELLITUS WITHOUT COMPLICATION, WITHOUT LONG-TERM CURRENT USE OF INSULIN: Primary | ICD-10-CM

## 2024-04-17 DIAGNOSIS — Z98.890 S/P RIGHT ROTATOR CUFF REPAIR: ICD-10-CM

## 2024-04-17 DIAGNOSIS — I10 PRIMARY HYPERTENSION: ICD-10-CM

## 2024-04-17 PROCEDURE — 3288F FALL RISK ASSESSMENT DOCD: CPT | Mod: CPTII,,, | Performed by: INTERNAL MEDICINE

## 2024-04-17 PROCEDURE — 3075F SYST BP GE 130 - 139MM HG: CPT | Mod: CPTII,,, | Performed by: INTERNAL MEDICINE

## 2024-04-17 PROCEDURE — 1159F MED LIST DOCD IN RCRD: CPT | Mod: CPTII,,, | Performed by: INTERNAL MEDICINE

## 2024-04-17 PROCEDURE — 1101F PT FALLS ASSESS-DOCD LE1/YR: CPT | Mod: CPTII,,, | Performed by: INTERNAL MEDICINE

## 2024-04-17 PROCEDURE — 99213 OFFICE O/P EST LOW 20 MIN: CPT | Mod: ,,, | Performed by: INTERNAL MEDICINE

## 2024-04-17 PROCEDURE — 3079F DIAST BP 80-89 MM HG: CPT | Mod: CPTII,,, | Performed by: INTERNAL MEDICINE

## 2024-04-17 PROCEDURE — 3008F BODY MASS INDEX DOCD: CPT | Mod: CPTII,,, | Performed by: INTERNAL MEDICINE

## 2024-04-17 PROCEDURE — 1160F RVW MEDS BY RX/DR IN RCRD: CPT | Mod: CPTII,,, | Performed by: INTERNAL MEDICINE

## 2024-05-29 ENCOUNTER — TELEPHONE (OUTPATIENT)
Dept: PRIMARY CARE CLINIC | Facility: CLINIC | Age: 64
End: 2024-05-29
Payer: COMMERCIAL

## 2024-05-29 RX ORDER — TESTOSTERONE CYPIONATE 1000 MG/10ML
150 INJECTION, SOLUTION INTRAMUSCULAR
Qty: 10 ML | Refills: 0 | Status: SHIPPED | OUTPATIENT
Start: 2024-05-29 | End: 2024-05-29 | Stop reason: CLARIF

## 2024-05-29 NOTE — TELEPHONE ENCOUNTER
----- Message from Gwendolyn Prince sent at 5/29/2024 10:55 AM CDT -----  Regarding: refills  Who Called: Jason Choi    Refill or New Rx:Refill  RX Name and Strength:testosterone cypionate (DEPOTESTOTERONE CYPIONATE) 100 mg/mL injection  How is the patient currently taking it? (ex. 1XDay):every 14days  Is this a 30 day or 90 day RX:  Local or Mail Order:local  List of preferred pharmacies on file (remove unneeded): [unfilled]  If different Pharmacy is requested, enter Pharmacy information here including location and phone number:  [facility] - [place], [place] - [address]   Phone: [phone]  Fax: [phone]        Ordering Provider:      Preferred Method of Contact: Phone Call  Patient's Preferred Phone Number on File: [phone]   Best Call Back Number, if different:  Additional Information:

## 2024-05-29 NOTE — TELEPHONE ENCOUNTER
I spoke with the pharmacist and they do have a syringe and she will show him exactly how much he needs to draw up for the dosage.

## 2024-05-29 NOTE — TELEPHONE ENCOUNTER
----- Message from Hernan Butler sent at 5/29/2024 12:45 PM CDT -----  .Type:  Pharmacy Calling to Clarify an RX    Name of Caller:Salvador or ask for anyone if he is not her   Pharmacy Name:[facility]   Prescription Name:testosterone cypionate (DEPOTESTOTERONE CYPIONATE) 100 mg/mL injection  What do they need to clarify?:has the 200 mg in stock wants to know can it be changed to it instead   Best Call Back Number:[phone]  Additional Information: called back line no answer

## 2024-06-18 LAB
LEFT EYE DM RETINOPATHY: NEGATIVE
LEFT EYE DM RETINOPATHY: NEGATIVE
RIGHT EYE DM RETINOPATHY: NEGATIVE
RIGHT EYE DM RETINOPATHY: NEGATIVE

## 2024-08-06 NOTE — TELEPHONE ENCOUNTER
Restorative Technician Note      Patient Name: Federico Bowen     Note Type: Mobility (Pt refused OOB/ambulation nursing aware.)  Patient Position Upon Consult: Supine  Activity Performed: Repositioned  Education Provided: Yes  Patient Position at End of Consult: Supine; All needs within reach; Bed/Chair alarm activated  Anya SALEH, Restorative Technician,                ----- Message from Eunice Martin sent at 10/31/2022  9:20 AM CDT -----  Regarding: Returning missed call  Type:  Patient Returning Call    Who Called:Jason  Who Left Message for Patient:Laura  Does the patient know what this is regarding?:he stated he was advised to go to orthopedic care  Would the patient rather a call back or a response via [facility]?   Best Call Back Number:[phone]  Additional Information: Darius called stating Laura left him a message to go to the orthopedic care in [place].  He stated he missed the call and was on vacation.

## 2024-10-10 ENCOUNTER — TELEPHONE (OUTPATIENT)
Dept: PRIMARY CARE CLINIC | Facility: CLINIC | Age: 64
End: 2024-10-10
Payer: COMMERCIAL

## 2024-10-14 ENCOUNTER — TELEPHONE (OUTPATIENT)
Dept: FAMILY MEDICINE | Facility: CLINIC | Age: 64
End: 2024-10-14
Payer: COMMERCIAL

## 2024-10-14 DIAGNOSIS — Z00.00 WELLNESS EXAMINATION: Primary | ICD-10-CM

## 2024-10-14 DIAGNOSIS — G47.33 OBSTRUCTIVE SLEEP APNEA SYNDROME: ICD-10-CM

## 2024-10-14 DIAGNOSIS — E11.9 TYPE 2 DIABETES MELLITUS WITHOUT COMPLICATION, WITHOUT LONG-TERM CURRENT USE OF INSULIN: ICD-10-CM

## 2024-10-14 NOTE — TELEPHONE ENCOUNTER
----- Message from Debra sent at 10/14/2024  9:11 AM CDT -----  .Who Called: Jason Choi        Preferred Method of Contact: Phone Call  Patient's Preferred Phone Number on File: [phone]   Best Call Back Number, if different:  Additional Information: pt needs labs in epic and please let him know once in

## 2024-10-15 NOTE — TELEPHONE ENCOUNTER
----- Message from Omi Osman sent at 10/15/2024  3:33 PM CDT -----  Regarding: advice  Who Called: Jason Choi    Caller is requesting assistance/information from provider's office.    Symptoms (please be specific):    How long has patient had these symptoms:    List of preferred pharmacies on file (remove unneeded): [unfilled]  If different, enter pharmacy into here including location and phone number:       Preferred Method of Contact: Phone Call  Patient's Preferred Phone Number on File: [phone]   Best Call Back Number, if different:    Additional Information: Pt called to get update on labs being ordered for wellness Thursday. Pt stated he will be going tomorrow to get labs done. Please advise.

## 2024-10-16 ENCOUNTER — PATIENT OUTREACH (OUTPATIENT)
Facility: CLINIC | Age: 64
End: 2024-10-16
Payer: COMMERCIAL

## 2024-10-16 NOTE — PROGRESS NOTES
10/16/2024- Valuebased outreach done - success - Patient states he will go at 5:30am for his lab @ [facility] - scheduling notified and stated perfect ([phone]) States he did see his eye Dr this year [facility] Dr Grzegorz Null . States he does not take a Statin and his Primary Dr and Cardiologist both aware. Very pleasant and thanked me for calling and contact # given to pt.

## 2024-10-16 NOTE — LETTER
AUTHORIZATION FOR RELEASE OF   CONFIDENTIAL INFORMATION      We are seeing Jason Choi, date of birth 1960, in the clinic at [facility]. Thu Soriano MD is the patient's PCP. Jason Choi has an outstanding lab/procedure at the time we reviewed his chart. In order to help keep his health information updated, he has authorized us to request the following medical record(s):                                     (  x)  EYE EXAM              Please fax records to [facility], [place], Debra A, MD,  at [phone] or email to [email or web address].               Patient Name: Jason Choi  : 1960  Patient Phone #: [phone]

## 2024-10-16 NOTE — PROGRESS NOTES
Thu Soriano MD   [id number] [place] [address]     Patient ID: [id number]     Chief Complaint: Wellness exam        HPI:     Jason Choi is a 64 y.o. male here today for an annual Wellness visit. He is still working. Since last here he's been at ENT to check heering and with Dr Corea for a check. No other complaints today.       Subjective:     Review of Systems   Constitutional: Negative.    HENT:  Positive for congestion and hearing loss.         Has hearing aides, one is not working, he hears pretty good just since ears were cleaned out. He goes back in 3 mo   Eyes: Negative.    Cardiovascular: Negative.         He saw Nahomy in May   Gastrointestinal:  Positive for abdominal pain.        Hernia was hurting the other day, he's been straining to get his bowels to move   Musculoskeletal:  Negative for joint pain and myalgias.   Skin: Negative.    Neurological:         Just hearing loss   Psychiatric/Behavioral: Negative.         Past Medical History:   Diagnosis Date    BPH (benign prostatic hyperplasia)     Calcaneal spur of foot     Decreased testosterone level     Diverticulosis     Lipoma of forearm     Male erectile dysfunction, unspecified     Mild mitral regurgitation     LAWSON on CPAP     Right bundle branch block (RBBB) with left anterior fascicular block (LAFB)     Tubular adenoma of colon     Type 2 diabetes mellitus without complications         Past Surgical History:   Procedure Laterality Date    COLON BIOPSY  10/04/2021    HIP REPLACEMENT ARTHROPLASTY Right 12/20/2022    LIPOMA RESECTION      ROTATOR CUFF REPAIR  12/22/2023       Family History   Problem Relation Name Age of Onset    Diabetes Mother      COPD Mother      Hypertension Father      Prostate cancer Father      Kidney disease Father      Diabetes Father      Osteoarthritis Father      Throat cancer Sister      Hypertension Sister      Diabetes Sister      Hypertension Brother      Atrial fibrillation Brother          Social History  "    Socioeconomic History    Marital status:    Tobacco Use    Smoking status: Never    Smokeless tobacco: Never   Substance and Sexual Activity    Alcohol use: Yes     Alcohol/week: 2.0 standard drinks of alcohol     Types: 2 Cans of beer per week    Drug use: Never    Sexual activity: Yes     Social Drivers of Health     Financial Resource Strain: Low Risk  (4/17/2024)    Overall Financial Resource Strain (CARDIA)     Difficulty of Paying Living Expenses: Not hard at all   Food Insecurity: No Food Insecurity (4/17/2024)    Hunger Vital Sign     Worried About Running Out of Food in the Last Year: Never true     Ran Out of Food in the Last Year: Never true   Transportation Needs: No Transportation Needs (4/17/2024)    PRAPARE - Transportation     Lack of Transportation (Medical): No     Lack of Transportation (Non-Medical): No   Physical Activity: Insufficiently Active (4/17/2024)    Exercise Vital Sign     Days of Exercise per Week: 2 days     Minutes of Exercise per Session: 10 min   Stress: No Stress Concern Present (4/17/2024)    [place] [employer] - Occupational Stress Questionnaire     Feeling of Stress : Not at all   Housing Stability: Low Risk  (4/17/2024)    Housing Stability Vital Sign     Unable to Pay for Housing in the Last Year: No     Homeless in the Last Year: No   His wife plans for him to keep working till 68, they are getting him a new truck that will have air ride so it shouldn't be as uncomfortable.     Review of patient's allergies indicates:  No Known Allergies    Outpatient Medications Marked as Taking for the 10/17/24 encounter (Office Visit) with Thu Soriano MD   Medication Sig Dispense Refill    ascorbic acid/zinc (ZINC WITH VITAMIN C ORAL) Take by mouth once daily.      aspirin 81 mg Cap Take 81 mg by mouth Daily.      BD LUER-JACKELINE SYRINGE 3 mL 23 x 1" Syrg USE AS DIRECTED TO INJECT TESTOSTERONE EVERY 2 WEEKS 25 each 3    BD REGULAR BEVEL NEEDLES 18 gauge " "x 1" Ndle USE AS DIRECTED TO DRAW UP TESTOSTERONE EVERY 2 WEEKS 25 each 3    docosahexaenoic acid/epa (FISH OIL ORAL) Take 500 mg by mouth once daily at 6am.      fluticasone propionate (FLONASE) 50 mcg/actuation nasal spray 1 spray (50 mcg total) by Each Nostril route once daily. 16 g 3    MEN'S MULTI-VITAMIN ORAL Take by mouth once daily.      MYRBETRIQ 50 mg Tb24 Take by mouth every morning.      red yeast rice 600 mg Cap Take 1,200 mg by mouth once daily.      sildenafiL (VIAGRA) 100 MG tablet Take 100 mg by mouth as needed.      solifenacin (VESICARE) 5 MG tablet Take 5 mg by mouth once daily.      tamsulosin (FLOMAX) 0.4 mg Cap TAKE ONE CAPSULE BY MOUTH DAILY 30 capsule 5    testosterone cypionate (DEPOTESTOTERONE CYPIONATE) 200 mg/mL injection        Current Facility-Administered Medications for the 10/17/24 encounter (Office Visit) with Tuh Soriano MD   Medication Dose Route Frequency Provider Last Rate Last Admin    [COMPLETED] pneumoc 20-pascale conj-dip cr(PF) (PREVNAR-20 (PF)) injection Syrg 0.5 mL  0.5 mL Intramuscular 1 time in Clinic/HOD    0.5 mL at 10/17/24 1633       Patient Care Team:  Thu Soriano MD as PCP - General (Internal Medicine)  Cali Salmon MD as Consulting Physician (Urology)  Roque Bojorquez MD as Consulting Physician (Otolaryngology)  Lizzie Corea MD as Consulting Physician (Cardiology)  Jose Raul Nolen as Consulting Physician (Dermatology)  Joseph De La Vega MD as Consulting Physician (Gastroenterology)  KALLIE Cochran MD (Orthopedic Surgery)  Salas Null as Consulting Provider (Optometry)  Itzel Figueroa LPN as Care Coordinator  Gregg Camilo MD as Consulting Physician (Otolaryngology)       Objective:     /86   Pulse 77   Temp 97.8 °F (36.6 °C) (Oral)   Resp 16   Ht 5' 6" (1.676 m)   Wt 97.1 kg (214 lb)   SpO2 (!) 94%   BMI 34.54 kg/m²     Physical Exam  Vitals reviewed.   Constitutional:       Appearance: Normal appearance.   HENT:      " Head: Normocephalic and atraumatic.      Right Ear: Tympanic membrane, ear canal and external ear normal.      Left Ear: Tympanic membrane, ear canal and external ear normal.      Mouth/Throat:      Mouth: Mucous membranes are moist.   Eyes:      General: No scleral icterus.     Extraocular Movements: Extraocular movements intact.      Conjunctiva/sclera: Conjunctivae normal.      Pupils: Pupils are equal, round, and reactive to light.   Neck:      Vascular: No carotid bruit.   Cardiovascular:      Rate and Rhythm: Normal rate and regular rhythm.      Pulses:           Dorsalis pedis pulses are 3+ on the right side and 3+ on the left side.        Posterior tibial pulses are 2+ on the right side and 2+ on the left side.   Pulmonary:      Effort: Pulmonary effort is normal.      Breath sounds: Normal breath sounds.   Abdominal:      General: Abdomen is flat. Bowel sounds are normal.      Palpations: Abdomen is soft.      Tenderness: There is no abdominal tenderness. There is no guarding.      Hernia: A hernia is present.      Comments: Umbilical hernia   Musculoskeletal:         General: No tenderness or deformity.      Cervical back: Normal range of motion.   Feet:      Right foot:      Protective Sensation: 10 sites tested.  10 sites sensed.      Skin integrity: Skin integrity normal.      Toenail Condition: Right toenails are normal.      Left foot:      Protective Sensation: 10 sites tested.  10 sites sensed.      Skin integrity: Skin integrity normal.      Toenail Condition: Left toenails are normal.   Lymphadenopathy:      Cervical: No cervical adenopathy.   Skin:     General: Skin is warm and dry.      Findings: No bruising or erythema.   Neurological:      Mental Status: He is alert and oriented to person, place, and time.      Motor: No weakness.      Coordination: Coordination normal.      Gait: Gait normal.           Lab Results   Component Value Date    HGBA1C 6.2 10/17/2024       Cholesterol Total   Date  Value Ref Range Status   10/17/2024 160 <=200 mg/dL Final     HDL Cholesterol   Date Value Ref Range Status   10/17/2024 46 35 - 60 mg/dL Final     Triglyceride   Date Value Ref Range Status   10/17/2024 78 34 - 140 mg/dL Final     lasta  Assessment/Plan:     1. Wellness examination  Comments:  Up to date on screening, will go for vaccines at [facility] for Shingles and RSV  Orders:  -     pneumoc 20-pascale conj-dip cr(PF) (PREVNAR-20 (PF)) injection Syrg 0.5 mL    2. Primary hypertension  Comments:  Acceptable control with low salt diet, if he'd get back with his exercise it would be even better    3. Type 2 diabetes mellitus without complication, without long-term current use of insulin  Comments:  A1C meseret, it is acceptable but if he would get back with the exercise the oatmeal pie he eats every night wouldn't have as much impact.    4. Decreased testosterone level  Comments:  Neusetzer managing now    5. Benign prostatic hyperplasia with elevated prostate specific antigen (PSA)  Comments:  Has follow up with urology    6. Obstructive sleep apnea syndrome  Comments:  Compliant with therapy, has CDL    7. Sensorineural hearing loss (SNHL) of both ears    8. Need for pneumococcal vaccine  -     pneumoc 20-pascale conj-dip cr(PF) (PREVNAR-20 (PF)) injection Syrg 0.5 mL    9. Renal insufficiency  Comments:  Creatinine bumped up, he usually drinks lots of water but was fasting today, recheck 1 wk hydrated, if high talk with Neusetzer about check for obstruction  Orders:  -     Basic Metabolic Panel; Future; Expected date: 10/24/2024    10. Encounter for hepatitis C screening test for low risk patient  -     Hepatitis C Antibody; Future; Expected date: 10/24/2024    11. Class 1 obesity due to excess calories with serious comorbidity and body mass index (BMI) of 34.0 to 34.9 in adult  Comments:  Encouraged to get back working on exercise             Follow up in about 6 months (around 4/17/2025) for HTN/DM follow up. In  addition to their scheduled follow up, the patient has also been instructed to follow up on as needed basis.     Signature:  Thu Soriano MD  [facility]  [address] [id number]

## 2024-10-17 ENCOUNTER — LAB VISIT (OUTPATIENT)
Dept: LAB | Facility: HOSPITAL | Age: 64
End: 2024-10-17
Attending: INTERNAL MEDICINE
Payer: COMMERCIAL

## 2024-10-17 ENCOUNTER — OFFICE VISIT (OUTPATIENT)
Dept: PRIMARY CARE CLINIC | Facility: CLINIC | Age: 64
End: 2024-10-17
Payer: COMMERCIAL

## 2024-10-17 VITALS
OXYGEN SATURATION: 94 % | TEMPERATURE: 98 F | BODY MASS INDEX: 34.39 KG/M2 | WEIGHT: 214 LBS | SYSTOLIC BLOOD PRESSURE: 136 MMHG | HEIGHT: 66 IN | RESPIRATION RATE: 16 BRPM | DIASTOLIC BLOOD PRESSURE: 86 MMHG | HEART RATE: 77 BPM

## 2024-10-17 DIAGNOSIS — E11.9 TYPE 2 DIABETES MELLITUS WITHOUT COMPLICATION, WITHOUT LONG-TERM CURRENT USE OF INSULIN: ICD-10-CM

## 2024-10-17 DIAGNOSIS — E66.09 CLASS 1 OBESITY DUE TO EXCESS CALORIES WITH SERIOUS COMORBIDITY AND BODY MASS INDEX (BMI) OF 34.0 TO 34.9 IN ADULT: ICD-10-CM

## 2024-10-17 DIAGNOSIS — Z00.00 WELLNESS EXAMINATION: Primary | ICD-10-CM

## 2024-10-17 DIAGNOSIS — E66.811 CLASS 1 OBESITY DUE TO EXCESS CALORIES WITH SERIOUS COMORBIDITY AND BODY MASS INDEX (BMI) OF 34.0 TO 34.9 IN ADULT: ICD-10-CM

## 2024-10-17 DIAGNOSIS — N28.9 RENAL INSUFFICIENCY: ICD-10-CM

## 2024-10-17 DIAGNOSIS — Z23 NEED FOR PNEUMOCOCCAL VACCINE: ICD-10-CM

## 2024-10-17 DIAGNOSIS — I10 PRIMARY HYPERTENSION: ICD-10-CM

## 2024-10-17 DIAGNOSIS — G47.33 OBSTRUCTIVE SLEEP APNEA SYNDROME: ICD-10-CM

## 2024-10-17 DIAGNOSIS — Z00.00 WELLNESS EXAMINATION: ICD-10-CM

## 2024-10-17 DIAGNOSIS — H90.3 SENSORINEURAL HEARING LOSS (SNHL) OF BOTH EARS: ICD-10-CM

## 2024-10-17 DIAGNOSIS — Z11.59 ENCOUNTER FOR HEPATITIS C SCREENING TEST FOR LOW RISK PATIENT: ICD-10-CM

## 2024-10-17 DIAGNOSIS — N40.0 BENIGN PROSTATIC HYPERPLASIA WITH ELEVATED PROSTATE SPECIFIC ANTIGEN (PSA): ICD-10-CM

## 2024-10-17 DIAGNOSIS — R97.20 BENIGN PROSTATIC HYPERPLASIA WITH ELEVATED PROSTATE SPECIFIC ANTIGEN (PSA): ICD-10-CM

## 2024-10-17 DIAGNOSIS — R79.89 DECREASED TESTOSTERONE LEVEL: ICD-10-CM

## 2024-10-17 LAB
ALBUMIN SERPL-MCNC: 3.8 G/DL (ref 3.4–4.8)
ALBUMIN/GLOB SERPL: 1.4 RATIO (ref 1.1–2)
ALP SERPL-CCNC: 43 UNIT/L (ref 40–150)
ALT SERPL-CCNC: 18 UNIT/L (ref 0–55)
ANION GAP SERPL CALC-SCNC: 8 MEQ/L
AST SERPL-CCNC: 18 UNIT/L (ref 5–34)
BACTERIA #/AREA URNS AUTO: NORMAL /HPF
BASOPHILS # BLD AUTO: 0.06 X10(3)/MCL
BASOPHILS NFR BLD AUTO: 0.9 %
BILIRUB SERPL-MCNC: 0.5 MG/DL
BILIRUB UR QL STRIP.AUTO: NEGATIVE
BUN SERPL-MCNC: 12.1 MG/DL (ref 8.4–25.7)
CALCIUM SERPL-MCNC: 9.2 MG/DL (ref 8.8–10)
CHLORIDE SERPL-SCNC: 106 MMOL/L (ref 98–107)
CHOLEST SERPL-MCNC: 160 MG/DL
CHOLEST/HDLC SERPL: 3 {RATIO} (ref 0–5)
CLARITY UR: CLEAR
CO2 SERPL-SCNC: 27 MMOL/L (ref 23–31)
COLOR UR AUTO: YELLOW
CREAT SERPL-MCNC: 1.23 MG/DL (ref 0.73–1.18)
CREAT/UREA NIT SERPL: 10
EOSINOPHIL # BLD AUTO: 0.12 X10(3)/MCL (ref 0–0.9)
EOSINOPHIL NFR BLD AUTO: 1.7 %
ERYTHROCYTE [DISTWIDTH] IN BLOOD BY AUTOMATED COUNT: 13.2 % (ref 11.5–17)
EST. AVERAGE GLUCOSE BLD GHB EST-MCNC: 131.2 MG/DL
GFR SERPLBLD CREATININE-BSD FMLA CKD-EPI: >60 ML/MIN/1.73/M2
GLOBULIN SER-MCNC: 2.8 GM/DL (ref 2.4–3.5)
GLUCOSE SERPL-MCNC: 125 MG/DL (ref 82–115)
GLUCOSE UR QL STRIP: NEGATIVE
HBA1C MFR BLD: 6.2 %
HCT VFR BLD AUTO: 48.7 % (ref 42–52)
HDLC SERPL-MCNC: 46 MG/DL (ref 35–60)
HGB BLD-MCNC: 16 G/DL (ref 14–18)
HGB UR QL STRIP: NEGATIVE
IMM GRANULOCYTES # BLD AUTO: 0.01 X10(3)/MCL (ref 0–0.04)
IMM GRANULOCYTES NFR BLD AUTO: 0.1 %
KETONES UR QL STRIP: NEGATIVE
LDLC SERPL CALC-MCNC: 98 MG/DL (ref 50–140)
LEUKOCYTE ESTERASE UR QL STRIP: NEGATIVE
LYMPHOCYTES # BLD AUTO: 2.43 X10(3)/MCL (ref 0.6–4.6)
LYMPHOCYTES NFR BLD AUTO: 35 %
MCH RBC QN AUTO: 29.5 PG (ref 27–31)
MCHC RBC AUTO-ENTMCNC: 32.9 G/DL (ref 33–36)
MCV RBC AUTO: 89.7 FL (ref 80–94)
MONOCYTES # BLD AUTO: 0.6 X10(3)/MCL (ref 0.1–1.3)
MONOCYTES NFR BLD AUTO: 8.6 %
NEUTROPHILS # BLD AUTO: 3.72 X10(3)/MCL (ref 2.1–9.2)
NEUTROPHILS NFR BLD AUTO: 53.7 %
NITRITE UR QL STRIP: NEGATIVE
PH UR STRIP: 6 [PH]
PLATELET # BLD AUTO: 235 X10(3)/MCL (ref 130–400)
PMV BLD AUTO: 9.4 FL (ref 7.4–10.4)
POTASSIUM SERPL-SCNC: 4.3 MMOL/L (ref 3.5–5.1)
PROT SERPL-MCNC: 6.6 GM/DL (ref 5.8–7.6)
PROT UR QL STRIP: NEGATIVE
RBC # BLD AUTO: 5.43 X10(6)/MCL (ref 4.7–6.1)
RBC #/AREA URNS AUTO: NORMAL /HPF
SODIUM SERPL-SCNC: 141 MMOL/L (ref 136–145)
SP GR UR STRIP.AUTO: 1.01 (ref 1–1.03)
SQUAMOUS #/AREA URNS AUTO: NORMAL /HPF
TRIGL SERPL-MCNC: 78 MG/DL (ref 34–140)
UROBILINOGEN UR STRIP-ACNC: 0.2
VLDLC SERPL CALC-MCNC: 16 MG/DL
WBC # BLD AUTO: 6.94 X10(3)/MCL (ref 4.5–11.5)
WBC #/AREA URNS AUTO: NORMAL /HPF

## 2024-10-17 PROCEDURE — 80061 LIPID PANEL: CPT

## 2024-10-17 PROCEDURE — 83036 HEMOGLOBIN GLYCOSYLATED A1C: CPT

## 2024-10-17 PROCEDURE — 82570 ASSAY OF URINE CREATININE: CPT

## 2024-10-17 PROCEDURE — 85025 COMPLETE CBC W/AUTO DIFF WBC: CPT

## 2024-10-17 PROCEDURE — 81001 URINALYSIS AUTO W/SCOPE: CPT

## 2024-10-17 PROCEDURE — 36415 COLL VENOUS BLD VENIPUNCTURE: CPT

## 2024-10-17 PROCEDURE — 80053 COMPREHEN METABOLIC PANEL: CPT

## 2024-10-17 RX ORDER — TESTOSTERONE CYPIONATE 200 MG/ML
INJECTION, SOLUTION INTRAMUSCULAR
COMMUNITY

## 2024-10-19 LAB
CREAT UR-MCNC: 57.9 MG/DL (ref 63–166)
MICROALBUMIN UR-MCNC: <5 UG/ML
MICROALBUMIN/CREAT RATIO PNL UR: ABNORMAL

## 2024-10-21 ENCOUNTER — PATIENT OUTREACH (OUTPATIENT)
Facility: CLINIC | Age: 64
End: 2024-10-21
Payer: COMMERCIAL

## 2024-10-21 ENCOUNTER — TELEPHONE (OUTPATIENT)
Dept: PRIMARY CARE CLINIC | Facility: CLINIC | Age: 64
End: 2024-10-21
Payer: COMMERCIAL

## 2024-10-21 NOTE — PROGRESS NOTES
Care Coordination Encounter Details:       MyChart Portal Status:         [x]  Reviewed MyChart Portal Status offered / enrolled if applicable        Additional Notes:     MyChart Outcomes: Pt is enrolled & active  and active on 10/20/2024         Updates Requested / Reviewed:        Updated Care Coordination Note, Care Everywhere, , Care Team Updated, Immunizations Reconciliation Completed or Queried: Alexandreiana, and Other    updated took shingles vaccine on 10/19/2024 and RSV has 2nd shingles around 12/19/2024         Health Maintenance Screening(s) Due:      Health Maintenance Topics Overdue:      VBHM Score: 0     Patient is not due for any topics at this time.                       Health Maintenance Topic(s) Outreach Outcomes & Actions Taken:    Tobacco History - Outreach Outcomes & Actions Taken  : Tobacco History Updated, Pt is a Non-Smoker and Other    nonsmoker                                         Additional Notes:             Chronic Disease Management:     Diabetes Measures        Lab Results   Component Value Date    HGBA1C 6.2 10/17/2024           [x]  Reviewed chart for active Diabetes diagnosis     []  Scheduled necessary follow up appointments if needed         Additional Notes:             Hypertension Measures        BP Readings from Last 1 Encounters:   10/17/24 136/86           [x]  Reviewed chart for active Hypertension diagnosis     []  Reviewed & documented Home BP Cuff     []  Documented a Remote BP if needed & applicable     []  Scheduled necessary follow up appointments with Primary Care if needed         Additional Notes:             Provider Team Continuity:     Last PCP Visit Date: 10/17/2024          [x]  Reviewed Primary Care Provider Visits, Annual Wellness Visit, and Future          Appointments to ensure appointments have been scheduled and/or           completed        Additional Notes:    Next Primary care 6m f/u 4/17/2025         Social Determinants of Health           [x]  Reviewed, completed, and/or updated the following sections:                  Food Insecurity, Transportation Needs, Financial Resource Strain,                 Tobacco Use        Additional Notes:             Care Management, Digital Medicine, and/or Education Referrals    OPCM Risk Score: 14.4         Next Steps - Referral Actions: Digital Medicine Outcomes and Actions Taken: not eligible and no referrals sent        Additional Notes:

## 2024-10-24 ENCOUNTER — LAB VISIT (OUTPATIENT)
Dept: LAB | Facility: HOSPITAL | Age: 64
End: 2024-10-24
Attending: INTERNAL MEDICINE
Payer: COMMERCIAL

## 2024-10-24 ENCOUNTER — TELEPHONE (OUTPATIENT)
Dept: PRIMARY CARE CLINIC | Facility: CLINIC | Age: 64
End: 2024-10-24
Payer: COMMERCIAL

## 2024-10-24 DIAGNOSIS — N28.9 RENAL INSUFFICIENCY: ICD-10-CM

## 2024-10-24 DIAGNOSIS — Z11.59 ENCOUNTER FOR HEPATITIS C SCREENING TEST FOR LOW RISK PATIENT: ICD-10-CM

## 2024-10-24 LAB
ANION GAP SERPL CALC-SCNC: 5 MEQ/L
BUN SERPL-MCNC: 13.9 MG/DL (ref 8.4–25.7)
CALCIUM SERPL-MCNC: 9.4 MG/DL (ref 8.8–10)
CHLORIDE SERPL-SCNC: 103 MMOL/L (ref 98–107)
CO2 SERPL-SCNC: 29 MMOL/L (ref 23–31)
CREAT SERPL-MCNC: 1 MG/DL (ref 0.72–1.25)
CREAT/UREA NIT SERPL: 14
GFR SERPLBLD CREATININE-BSD FMLA CKD-EPI: >60 ML/MIN/1.73/M2
GLUCOSE SERPL-MCNC: 110 MG/DL (ref 82–115)
HCV AB SERPL QL IA: NONREACTIVE
POTASSIUM SERPL-SCNC: 4.4 MMOL/L (ref 3.5–5.1)
SODIUM SERPL-SCNC: 137 MMOL/L (ref 136–145)

## 2024-10-24 PROCEDURE — 86803 HEPATITIS C AB TEST: CPT

## 2024-10-24 PROCEDURE — 80048 BASIC METABOLIC PNL TOTAL CA: CPT

## 2024-10-24 PROCEDURE — 36415 COLL VENOUS BLD VENIPUNCTURE: CPT

## 2024-10-24 NOTE — TELEPHONE ENCOUNTER
----- Message from Thu Soriano MD sent at 10/24/2024  4:35 PM CDT -----  Good kidney came back down to normal. Watch the arthritis meds (Mobic/Motrin/Advil/Aleve etc) Tylenol is best choice.

## 2024-12-05 ENCOUNTER — OFFICE VISIT (OUTPATIENT)
Dept: NEUROLOGY | Facility: CLINIC | Age: 64
End: 2024-12-05
Payer: COMMERCIAL

## 2024-12-05 VITALS
BODY MASS INDEX: 33.59 KG/M2 | WEIGHT: 209 LBS | HEIGHT: 66 IN | DIASTOLIC BLOOD PRESSURE: 76 MMHG | SYSTOLIC BLOOD PRESSURE: 130 MMHG

## 2024-12-05 DIAGNOSIS — G47.33 OBSTRUCTIVE SLEEP APNEA SYNDROME: Primary | ICD-10-CM

## 2024-12-05 PROCEDURE — 3066F NEPHROPATHY DOC TX: CPT | Mod: CPTII,S$GLB,, | Performed by: NURSE PRACTITIONER

## 2024-12-05 PROCEDURE — 3078F DIAST BP <80 MM HG: CPT | Mod: CPTII,S$GLB,, | Performed by: NURSE PRACTITIONER

## 2024-12-05 PROCEDURE — 3075F SYST BP GE 130 - 139MM HG: CPT | Mod: CPTII,S$GLB,, | Performed by: NURSE PRACTITIONER

## 2024-12-05 PROCEDURE — 3044F HG A1C LEVEL LT 7.0%: CPT | Mod: CPTII,S$GLB,, | Performed by: NURSE PRACTITIONER

## 2024-12-05 PROCEDURE — 99213 OFFICE O/P EST LOW 20 MIN: CPT | Mod: S$GLB,,, | Performed by: NURSE PRACTITIONER

## 2024-12-05 PROCEDURE — 1101F PT FALLS ASSESS-DOCD LE1/YR: CPT | Mod: CPTII,S$GLB,, | Performed by: NURSE PRACTITIONER

## 2024-12-05 PROCEDURE — 3288F FALL RISK ASSESSMENT DOCD: CPT | Mod: CPTII,S$GLB,, | Performed by: NURSE PRACTITIONER

## 2024-12-05 PROCEDURE — 3061F NEG MICROALBUMINURIA REV: CPT | Mod: CPTII,S$GLB,, | Performed by: NURSE PRACTITIONER

## 2024-12-05 PROCEDURE — 3008F BODY MASS INDEX DOCD: CPT | Mod: CPTII,S$GLB,, | Performed by: NURSE PRACTITIONER

## 2024-12-05 PROCEDURE — 1159F MED LIST DOCD IN RCRD: CPT | Mod: CPTII,S$GLB,, | Performed by: NURSE PRACTITIONER

## 2024-12-05 PROCEDURE — 99999 PR PBB SHADOW E&M-EST. PATIENT-LVL III: CPT | Mod: PBBFAC,,, | Performed by: NURSE PRACTITIONER

## 2024-12-05 NOTE — PROGRESS NOTES
"  Established [facility] Patient   SUBJECTIVE:    Patient ID: Jason Choi , 64 y.o.    Past Medical History:   Diagnosis Date    BPH (benign prostatic hyperplasia)     Calcaneal spur of foot     Decreased testosterone level     Diverticulosis     Lipoma of forearm     Male erectile dysfunction, unspecified     Mild mitral regurgitation     LAWSON on CPAP     Right bundle branch block (RBBB) with left anterior fascicular block (LAFB)     Tubular adenoma of colon     Type 2 diabetes mellitus without complications        Past Surgical History:   Procedure Laterality Date    COLON BIOPSY  10/04/2021    HIP REPLACEMENT ARTHROPLASTY Right 12/20/2022    LIPOMA RESECTION      ROTATOR CUFF REPAIR  12/22/2023       Review of patient's allergies indicates:  No Known Allergies    Chief Complaint: [facility] f/u    History of Present Illness:     Here for PAP follow up. Sleeping well at night with PAP. "CPAP is a Lifesaver"   Feels better the following day after PAP use; denies EDS.   Denies problems with PAP machine. Able to get supplies    Review of Systems - as per HPI, otherwise a balanced 10 systems review is negative.      Current Medications:  Current Outpatient Medications   Medication Instructions    ascorbic acid/zinc (ZINC WITH VITAMIN C ORAL) Daily    aspirin 81 mg, Daily    BD LUER-JACKELINE SYRINGE 3 mL 23 x 1" Syrg USE AS DIRECTED TO INJECT TESTOSTERONE EVERY 2 WEEKS    BD REGULAR BEVEL NEEDLES 18 gauge x 1" Ndle USE AS DIRECTED TO DRAW UP TESTOSTERONE EVERY 2 WEEKS    docosahexaenoic acid/epa (FISH OIL ORAL) 500 mg, Daily    fluticasone propionate (FLONASE) 50 mcg, Each Nostril, Daily    melatonin 10 mg, Nightly    MEN'S MULTI-VITAMIN ORAL Daily    MYRBETRIQ 50 mg Tb24 Every morning    red yeast rice 1,200 mg, Daily    sildenafiL (VIAGRA) 100 mg, As needed (PRN)    solifenacin (VESICARE) 5 mg, Daily    tamsulosin (FLOMAX) 0.4 mg Cap TAKE ONE CAPSULE BY MOUTH DAILY    testosterone cypionate (DEPOTESTOTERONE CYPIONATE) 200 mg/mL " "injection          OBJECTIVE:    Vitals:  /76 (BP Location: Left arm, Patient Position: Sitting)   Ht 5' 6" (1.676 m)   Wt 94.8 kg (209 lb)   BMI 33.73 kg/m²      Physical Exam:  Constitutional  he appears well-developed and well-nourished. he is well groomed.    Accompanied by - self  Appearance - well appearing, no apparent distress, unassisted  Skin- no obvious lesions noted    Neurologic  Cortical function - The patient is alert, attentive   Speech - clear   Cranial nerves:  CN 3, 4, 6 EOMs - normal. No ptosis or lateral gaze deviation  CN 7 - no face asymmetry   CN 8 - hearing is grossly normal  Gait - unassisted; posture upright. gait is steady with normal steps    Review of Data:      PAP Compliance Report  Last 30 days  Usage > 4 hrs - 100  Usage- 100  AHI - 0.9        12/5/2024     1:38 PM   EPWORTH SLEEPINESS SCALE   Sitting and reading 0   Watching TV 0   Sitting, inactive in a public place (e.g. a theatre or a meeting) 0   As a passenger in a car for an hour without a break 0   Lying down to rest in the afternoon when circumstances permit 0   Sitting and talking to someone 0   Sitting quietly after a lunch without alcohol 0   In a car, while stopped for a few minutes in traffic 0   Total score 0          ASSESSMENT /PLAN:    Problem List Items Addressed This Visit          Other    Obstructive sleep apnea syndrome - Primary    - Continues to benefit from PAP therapy. Encouraged nightly use of PAP.   - Drowsy driving may still occur despite PAP use.   - F/u in 1 yr       Questions and concerns were sought and answered to the patient's stated verbal satisfaction.    The patient verbalizes understanding and agreement with the above stated treatment plan.   Items discussed include acute and/or chronic neurological, sleep, or other issues and their attendant differential diagnoses.  Potential for additional testing, treatment options, and prognosis also discussed.    __*_single dx ___multiple issues/ " diagnoses  ___ low __* mod ___ high complexity of data  __*_low __mod ___ high risks     Medical Decision Making (MDM) used for CPT choice:  _*__low  ___moderate  ____high        MOHSEN Garrido  [facility]  [phone]

## 2024-12-09 ENCOUNTER — PATIENT OUTREACH (OUTPATIENT)
Facility: CLINIC | Age: 64
End: 2024-12-09
Payer: COMMERCIAL

## 2024-12-09 NOTE — PROGRESS NOTES
Health Maintenance Topic(s) Outreach Outcomes & Actions Taken:       Additional Notes:  Rehyperlinked DM eye exam dated 6/18/24

## 2024-12-24 DIAGNOSIS — R79.89 DECREASED TESTOSTERONE LEVEL: Primary | ICD-10-CM

## 2024-12-24 RX ORDER — TESTOSTERONE CYPIONATE 200 MG/ML
INJECTION, SOLUTION INTRAMUSCULAR
Qty: 10 ML | Refills: 0 | Status: SHIPPED | OUTPATIENT
Start: 2024-12-24

## 2025-03-24 RX ORDER — OSELTAMIVIR PHOSPHATE 75 MG/1
75 CAPSULE ORAL 2 TIMES DAILY
Qty: 10 CAPSULE | Refills: 0 | Status: SHIPPED | OUTPATIENT
Start: 2025-03-24 | End: 2025-03-29

## 2025-04-01 LAB — PSA SERPL-MCNC: 4.44 NG/ML (ref 0–4)

## 2025-04-07 ENCOUNTER — PATIENT MESSAGE (OUTPATIENT)
Dept: PRIMARY CARE CLINIC | Facility: CLINIC | Age: 65
End: 2025-04-07
Payer: COMMERCIAL

## 2025-04-07 DIAGNOSIS — I10 PRIMARY HYPERTENSION: ICD-10-CM

## 2025-04-07 DIAGNOSIS — E11.9 TYPE 2 DIABETES MELLITUS WITHOUT COMPLICATION, WITHOUT LONG-TERM CURRENT USE OF INSULIN: Primary | ICD-10-CM

## 2025-04-12 ENCOUNTER — LAB VISIT (OUTPATIENT)
Dept: LAB | Facility: HOSPITAL | Age: 65
End: 2025-04-12
Attending: INTERNAL MEDICINE
Payer: COMMERCIAL

## 2025-04-12 DIAGNOSIS — E11.9 TYPE 2 DIABETES MELLITUS WITHOUT COMPLICATION, WITHOUT LONG-TERM CURRENT USE OF INSULIN: ICD-10-CM

## 2025-04-12 DIAGNOSIS — I10 PRIMARY HYPERTENSION: ICD-10-CM

## 2025-04-12 LAB
ANION GAP SERPL CALC-SCNC: 5 MEQ/L
BUN SERPL-MCNC: 16.2 MG/DL (ref 8.4–25.7)
CALCIUM SERPL-MCNC: 9.3 MG/DL (ref 8.8–10)
CHLORIDE SERPL-SCNC: 105 MMOL/L (ref 98–107)
CO2 SERPL-SCNC: 30 MMOL/L (ref 23–31)
CREAT SERPL-MCNC: 1.07 MG/DL (ref 0.72–1.25)
CREAT/UREA NIT SERPL: 15
EST. AVERAGE GLUCOSE BLD GHB EST-MCNC: 131.2 MG/DL
GFR SERPLBLD CREATININE-BSD FMLA CKD-EPI: >60 ML/MIN/1.73/M2
GLUCOSE SERPL-MCNC: 118 MG/DL (ref 82–115)
HBA1C MFR BLD: 6.2 %
POTASSIUM SERPL-SCNC: 5.3 MMOL/L (ref 3.5–5.1)
SODIUM SERPL-SCNC: 140 MMOL/L (ref 136–145)

## 2025-04-12 PROCEDURE — 83036 HEMOGLOBIN GLYCOSYLATED A1C: CPT

## 2025-04-12 PROCEDURE — 36415 COLL VENOUS BLD VENIPUNCTURE: CPT

## 2025-04-12 PROCEDURE — 80048 BASIC METABOLIC PNL TOTAL CA: CPT

## 2025-04-13 ENCOUNTER — RESULTS FOLLOW-UP (OUTPATIENT)
Dept: PRIMARY CARE CLINIC | Facility: CLINIC | Age: 65
End: 2025-04-13

## 2025-04-14 ENCOUNTER — TELEPHONE (OUTPATIENT)
Dept: PRIMARY CARE CLINIC | Facility: CLINIC | Age: 65
End: 2025-04-14
Payer: COMMERCIAL

## 2025-04-14 DIAGNOSIS — E87.5 HYPERKALEMIA: Primary | ICD-10-CM

## 2025-04-14 NOTE — TELEPHONE ENCOUNTER
Result given to patient states he eat a banana every day ok with going to [facility] for repeat lab.

## 2025-04-14 NOTE — TELEPHONE ENCOUNTER
----- Message from Thu Soriano MD sent at 4/13/2025  9:23 PM CDT -----  Potassium up, did they have trouble with the stick? Any increase in banana or avocado? He doesn't show any kidney failure or medication I'd expect issues with potassium from.  I'll probably repeat it   at the hospital.   ----- Message -----  From: Lab, Background User  Sent: 4/12/2025   7:56 AM CDT  To: Thu Soriano MD

## 2025-04-15 ENCOUNTER — DOCUMENTATION ONLY (OUTPATIENT)
Dept: PRIMARY CARE CLINIC | Facility: CLINIC | Age: 65
End: 2025-04-15
Payer: COMMERCIAL

## 2025-04-16 ENCOUNTER — PATIENT OUTREACH (OUTPATIENT)
Facility: CLINIC | Age: 65
End: 2025-04-16
Payer: COMMERCIAL

## 2025-04-16 NOTE — PROGRESS NOTES
No call needed. Pt has f/u 4/17/2025 with PCP B/P in range No HM metrics due except Low dose Statin Therapy. Chart reviewed/updated

## 2025-04-17 ENCOUNTER — PATIENT MESSAGE (OUTPATIENT)
Dept: PRIMARY CARE CLINIC | Facility: CLINIC | Age: 65
End: 2025-04-17

## 2025-04-17 ENCOUNTER — OFFICE VISIT (OUTPATIENT)
Dept: PRIMARY CARE CLINIC | Facility: CLINIC | Age: 65
End: 2025-04-17
Payer: COMMERCIAL

## 2025-04-17 VITALS
OXYGEN SATURATION: 94 % | TEMPERATURE: 98 F | SYSTOLIC BLOOD PRESSURE: 132 MMHG | DIASTOLIC BLOOD PRESSURE: 76 MMHG | HEIGHT: 66 IN | WEIGHT: 208.63 LBS | RESPIRATION RATE: 16 BRPM | HEART RATE: 64 BPM | BODY MASS INDEX: 33.53 KG/M2

## 2025-04-17 DIAGNOSIS — R79.89 DECREASED TESTOSTERONE LEVEL: ICD-10-CM

## 2025-04-17 DIAGNOSIS — E87.5 HYPERKALEMIA: ICD-10-CM

## 2025-04-17 DIAGNOSIS — E11.9 TYPE 2 DIABETES MELLITUS WITHOUT COMPLICATION, WITHOUT LONG-TERM CURRENT USE OF INSULIN: Primary | ICD-10-CM

## 2025-04-17 DIAGNOSIS — I10 PRIMARY HYPERTENSION: ICD-10-CM

## 2025-04-17 LAB — POTASSIUM SERPL-SCNC: 4.3 MMOL/L (ref 3.5–5.2)

## 2025-04-17 PROCEDURE — 1126F AMNT PAIN NOTED NONE PRSNT: CPT | Mod: CPTII,,, | Performed by: INTERNAL MEDICINE

## 2025-04-17 PROCEDURE — 3044F HG A1C LEVEL LT 7.0%: CPT | Mod: CPTII,,, | Performed by: INTERNAL MEDICINE

## 2025-04-17 PROCEDURE — 3078F DIAST BP <80 MM HG: CPT | Mod: CPTII,,, | Performed by: INTERNAL MEDICINE

## 2025-04-17 PROCEDURE — 3075F SYST BP GE 130 - 139MM HG: CPT | Mod: CPTII,,, | Performed by: INTERNAL MEDICINE

## 2025-04-17 PROCEDURE — 3008F BODY MASS INDEX DOCD: CPT | Mod: CPTII,,, | Performed by: INTERNAL MEDICINE

## 2025-04-17 PROCEDURE — 1160F RVW MEDS BY RX/DR IN RCRD: CPT | Mod: CPTII,,, | Performed by: INTERNAL MEDICINE

## 2025-04-17 PROCEDURE — 99214 OFFICE O/P EST MOD 30 MIN: CPT | Mod: ,,, | Performed by: INTERNAL MEDICINE

## 2025-04-17 PROCEDURE — 36415 COLL VENOUS BLD VENIPUNCTURE: CPT | Mod: ,,, | Performed by: INTERNAL MEDICINE

## 2025-04-17 PROCEDURE — 1159F MED LIST DOCD IN RCRD: CPT | Mod: CPTII,,, | Performed by: INTERNAL MEDICINE

## 2025-04-17 PROCEDURE — 1101F PT FALLS ASSESS-DOCD LE1/YR: CPT | Mod: CPTII,,, | Performed by: INTERNAL MEDICINE

## 2025-04-17 PROCEDURE — 3288F FALL RISK ASSESSMENT DOCD: CPT | Mod: CPTII,,, | Performed by: INTERNAL MEDICINE

## 2025-04-17 RX ORDER — TADALAFIL 20 MG/1
20 TABLET ORAL
COMMUNITY
Start: 2025-04-10

## 2025-04-17 RX ORDER — DOCUSATE SODIUM 100 MG/1
100 CAPSULE, LIQUID FILLED ORAL 2 TIMES DAILY
COMMUNITY

## 2025-04-17 RX ORDER — TESTOSTERONE CYPIONATE 200 MG/ML
150 INJECTION, SOLUTION INTRAMUSCULAR
Qty: 10 ML | Refills: 0 | Status: SHIPPED | OUTPATIENT
Start: 2025-04-17

## 2025-04-17 NOTE — PROGRESS NOTES
Thu Soriano MD   [id number] [id number]     Patient ID: [id number]     Chief Complaint: 6 Months follow up for HTN/DM        HPI:     Jason Choi is a 64 y.o. male here today for a follow up of HTN and DM. He had done lab with Dr Salmon and I added mine. He showed a high potassium but had been eating bananas every day and they had a difficult stick. He's off the bananas for us to recheck it.  He has been trying OTC medications for ED. Dr Salmon gave him Cialis 20 mg every other day and it seems to work a little better than the Viagra had. He is still taking his testosterone shots.       Subjective:     Review of Systems   Constitutional:         Getting rougher keeping his job duties, they tell him they will need to hire two to do what he's doing when he retires but don't hire anyone to help  him now.    Respiratory: Negative.     Cardiovascular: Negative.    Gastrointestinal: Negative.    Genitourinary: Negative.    Endo/Heme/Allergies:         Not checking sugar but did his lab   Psychiatric/Behavioral:          He'd like to retire now but his wife is trying to get him to hold on till 68       Past Medical History:   Diagnosis Date    BPH (benign prostatic hyperplasia)     Calcaneal spur of foot     Decreased testosterone level     Diverticulosis     Lipoma of forearm     Male erectile dysfunction, unspecified     Mild mitral regurgitation     LAWSON on CPAP     Right bundle branch block (RBBB) with left anterior fascicular block (LAFB)     Tubular adenoma of colon     Type 2 diabetes mellitus without complications         Past Surgical History:   Procedure Laterality Date    COLON BIOPSY  10/04/2021    HIP REPLACEMENT ARTHROPLASTY Right 12/20/2022    LIPOMA RESECTION      ROTATOR CUFF REPAIR  12/22/2023       Family History   Problem Relation Name Age of Onset    Diabetes Mother      COPD Mother      Hypertension Father      Prostate cancer Father      Kidney disease Father      Diabetes Father    "   Osteoarthritis Father      Throat cancer Sister      Hypertension Sister      Diabetes Sister      Hypertension Brother      Atrial fibrillation Brother          Social History[1]    Review of patient's allergies indicates:  No Known Allergies    Outpatient Medications Marked as Taking for the 4/17/25 encounter (Office Visit) with Thu Soriano MD   Medication Sig Dispense Refill    aspirin 81 mg Cap Take 81 mg by mouth Daily.      BD LUER-JACKELINE SYRINGE 3 mL 23 x 1" Syrg USE AS DIRECTED TO INJECT TESTOSTERONE EVERY 2 WEEKS 25 each 3    BD REGULAR BEVEL NEEDLES 18 gauge x 1" Ndle USE AS DIRECTED TO DRAW UP TESTOSTERONE EVERY 2 WEEKS 25 each 3    calcium carb/D3/magnesium/zinc (ROSALINO MAG ZINC PLUS D3 ORAL) Take by mouth. 2 Q HS      docosahexaenoic acid/epa (FISH OIL ORAL) Take 500 mg by mouth once daily at 6am.      docusate sodium (COLACE) 100 MG capsule Take 100 mg by mouth 2 (two) times daily.      fluticasone propionate (FLONASE) 50 mcg/actuation nasal spray 1 spray (50 mcg total) by Each Nostril route once daily. 16 g 3    melatonin 10 mg Tab Take 10 mg by mouth every evening.      MEN'S MULTI-VITAMIN ORAL Take by mouth once daily.      red yeast rice 600 mg Cap Take 1,200 mg by mouth once daily.      solifenacin (VESICARE) 5 MG tablet Take 10 mg by mouth once daily.      tadalafiL (CIALIS) 20 MG Tab Take 20 mg by mouth every 48 hours as needed.      tamsulosin (FLOMAX) 0.4 mg Cap TAKE ONE CAPSULE BY MOUTH DAILY 30 capsule 5    testosterone cypionate (DEPOTESTOTERONE CYPIONATE) 200 mg/mL injection INJECT INTRAMUSCULARLY 0.75 MLS EVERY 14 (FOURTEEN) DAYS. WITH SYRINGE AND NEEDLES **DISCARD VIAL 28 DAYS AFTER OPENING** 10 mL 0       Patient Care Team:  Thu Soriano MD as PCP - General (Internal Medicine)  Cali Salmon MD as Consulting Physician (Urology)  Roque Bojorquez MD as Consulting Physician (Otolaryngology)  Lizzie Corea MD as Consulting Physician (Cardiology)  Jose Raul Nolen as Consulting " "Physician (Dermatology)  Joseph De La Vega MD as Consulting Physician (Gastroenterology)  KALLIE Cochran MD (Orthopedic Surgery)  Salas Null as Consulting Provider (Optometry)  Itzel Figueroa LPN as Care Coordinator  Gregg Camilo MD as Consulting Physician (Otolaryngology)       Objective:     /76   Pulse 64   Temp 98.3 °F (36.8 °C) (Oral)   Resp 16   Ht 5' 6" (1.676 m)   Wt 94.6 kg (208 lb 9.6 oz)   SpO2 (!) 94%   BMI 33.67 kg/m²     Physical Exam  Vitals reviewed.   Cardiovascular:      Rate and Rhythm: Normal rate and regular rhythm.   Pulmonary:      Effort: Pulmonary effort is normal.      Breath sounds: Normal breath sounds.   Abdominal:      General: Abdomen is flat. Bowel sounds are normal.      Palpations: Abdomen is soft.   Skin:     General: Skin is warm and dry.   Neurological:      Mental Status: He is alert.               Assessment/Plan:     1. Type 2 diabetes mellitus without complication, without long-term current use of insulin  Comments:  Excellent control off medications.    2. Primary hypertension  Comments:  Diet controlled, still working on maintaining that    3. Hyperkalemia  Comments:  Recheck here off bananas  Orders:  -     Potassium             Follow up in about 6 months (around 10/17/2025) for Wellness. In addition to their scheduled follow up, the patient has also been instructed to follow up on as needed basis.     Signature:  Thu Soriano MD  [facility]  [address] [place] [address]         [1]   Social History  Socioeconomic History    Marital status:    Tobacco Use    Smoking status: Never    Smokeless tobacco: Never   Substance and Sexual Activity    Alcohol use: Yes     Alcohol/week: 2.0 standard drinks of alcohol     Types: 2 Cans of beer per week    Drug use: Never    Sexual activity: Yes     Social Drivers of Health     Financial Resource Strain: Low Risk  (4/17/2025)    Overall Financial Resource Strain (CARDIA)     " Difficulty of Paying Living Expenses: Not hard at all   Food Insecurity: No Food Insecurity (4/17/2025)    Hunger Vital Sign     Worried About Running Out of Food in the Last Year: Never true     Ran Out of Food in the Last Year: Never true   Transportation Needs: No Transportation Needs (4/17/2025)    PRAPARE - Transportation     Lack of Transportation (Medical): No     Lack of Transportation (Non-Medical): No   Physical Activity: Inactive (4/17/2025)    Exercise Vital Sign     Days of Exercise per Week: 0 days     Minutes of Exercise per Session: 0 min   Stress: No Stress Concern Present (4/17/2025)    [employer] - Occupational Stress Questionnaire     Feeling of Stress : Not at all   Housing Stability: Low Risk  (4/17/2025)    Housing Stability Vital Sign     Unable to Pay for Housing in the Last Year: No     Number of Times Moved in the Last Year: 0     Homeless in the Last Year: No

## 2025-04-18 ENCOUNTER — RESULTS FOLLOW-UP (OUTPATIENT)
Dept: PRIMARY CARE CLINIC | Facility: CLINIC | Age: 65
End: 2025-04-18

## 2025-04-21 ENCOUNTER — TELEPHONE (OUTPATIENT)
Dept: PRIMARY CARE CLINIC | Facility: CLINIC | Age: 65
End: 2025-04-21
Payer: COMMERCIAL

## 2025-04-21 NOTE — TELEPHONE ENCOUNTER
----- Message from Thu Soriano MD sent at 4/18/2025 11:49 AM CDT -----  Normal so I'd limit the banana to 3 or 4 a week.   ----- Message -----  From: Angel Oro  Sent: 4/17/2025  10:08 PM CDT  To: Thu Soriano MD

## 2025-05-05 ENCOUNTER — PATIENT MESSAGE (OUTPATIENT)
Dept: PRIMARY CARE CLINIC | Facility: CLINIC | Age: 65
End: 2025-05-05
Payer: COMMERCIAL